# Patient Record
Sex: MALE | Race: WHITE | Employment: OTHER | URBAN - NONMETROPOLITAN AREA
[De-identification: names, ages, dates, MRNs, and addresses within clinical notes are randomized per-mention and may not be internally consistent; named-entity substitution may affect disease eponyms.]

---

## 2017-07-16 LAB
AMORPHOUS: ABNORMAL /HPF
BACTERIA: ABNORMAL /HPF
EPITHELIAL CELLS, UA: ABNORMAL /HPF
MUCUS: ABNORMAL /LPF
RBC UA: ABNORMAL /HPF (ref 0–2)
WBC UA: ABNORMAL /HPF (ref 0–5)

## 2018-08-06 ENCOUNTER — HOSPITAL ENCOUNTER (EMERGENCY)
Age: 43
Discharge: HOME | End: 2018-08-06
Payer: COMMERCIAL

## 2018-08-06 VITALS
SYSTOLIC BLOOD PRESSURE: 105 MMHG | BODY MASS INDEX: 26.41 KG/M2 | HEIGHT: 72 IN | RESPIRATION RATE: 20 BRPM | TEMPERATURE: 98.4 F | OXYGEN SATURATION: 100 % | WEIGHT: 195 LBS | DIASTOLIC BLOOD PRESSURE: 92 MMHG | HEART RATE: 75 BPM

## 2018-08-06 DIAGNOSIS — H81.10 BENIGN PAROXYSMAL POSITIONAL VERTIGO, UNSPECIFIED LATERALITY: ICD-10-CM

## 2018-08-06 DIAGNOSIS — T16.1XXA FOREIGN BODY OF RIGHT EAR, INITIAL ENCOUNTER: Primary | ICD-10-CM

## 2018-08-06 PROCEDURE — 4500000022 HC ED LEVEL 2 PROCEDURE

## 2018-08-06 PROCEDURE — 99282 EMERGENCY DEPT VISIT SF MDM: CPT

## 2018-08-06 RX ORDER — MECLIZINE HYDROCHLORIDE 25 MG/1
25 TABLET ORAL 3 TIMES DAILY PRN
Qty: 15 TABLET | Refills: 0 | Status: SHIPPED | OUTPATIENT
Start: 2018-08-06

## 2018-08-06 NOTE — ED PROVIDER NOTES
LENNY independent visit    Patient was pressure washing a big rig truck and there was a lot of debris coming off of it and water getting in his face 2 weeks ago and ever since has had right ear ache he has flushed his ear and intermittent episodes of vertigo x 2 weeks usually in the mornings if he lays down or quick movements turning head and then dizziness seems to go away throughout the day. Hx vertigo several yrs tx with antivert ago this feels the same. No headache. No numbness or weakness. No vision changes. No chest pain or SOB. His mother told him to flush his ear with hydrogen peroxide he did this Saturday, 2 days ago and has had increased dizziness and ringing in ears since. No drainage or bleeding from ear. No dizziness now. The history is provided by the patient and the spouse. Ear Problem   Location:  Right  Behind ear:  No abnormality  Quality:  Aching  Onset quality:  Gradual  Duration:  2 weeks  Timing:  Intermittent  Chronicity:  New  Context: water in ear    Associated symptoms: tinnitus    Associated symptoms: no abdominal pain, no ear discharge, no fever, no headaches, no hearing loss, no neck pain, no rash, no rhinorrhea, no sore throat and no vomiting        Review of Systems   Constitutional: Negative for chills and fever. HENT: Positive for ear pain and tinnitus. Negative for ear discharge, hearing loss, rhinorrhea and sore throat. Eyes: Negative for visual disturbance. Respiratory: Negative for shortness of breath. Cardiovascular: Negative for chest pain. Gastrointestinal: Negative for abdominal pain and vomiting. Musculoskeletal: Negative for neck pain. Skin: Negative for rash. Neurological: Positive for dizziness. Negative for syncope, facial asymmetry, speech difficulty, weakness, numbness and headaches. Psychiatric/Behavioral: Negative for confusion. PAST MEDICAL HISTORY   has a past medical history of Abdominal pain (2012); Constipation; Nausea;  Reflux; and Wears glasses. PAST SURGICAL HISTORY   has a past surgical history that includes knee surgery; Colonoscopy (5/14/2012); and Wrist surgery (Left, 7/17/2013). FAMILY HISTORY  family history includes Cancer in his father, maternal aunt, and mother; Heart Disease in his father and mother; Mental Illness in his brother, father, and mother; Substance Abuse in his brother and mother. SOCIAL HISTORY   reports that he has been smoking Cigarettes. He has been smoking about 0.75 packs per day. He has never used smokeless tobacco. He reports that he drinks alcohol. He reports that he uses drugs, including Marijuana. HOME MEDICATIONS     none     ALLERGIES  has No Known Allergies. BP (!) 105/92   Pulse 75   Temp 98.4 °F (36.9 °C) (Oral)   Resp 20   Ht 6' (1.829 m)   Wt 195 lb (88.5 kg)   SpO2 100%   BMI 26.45 kg/m²     Physical Exam   Constitutional: He is oriented to person, place, and time. He appears well-developed and well-nourished. HENT:   Head: Normocephalic and atraumatic. Right Ear: Tympanic membrane normal. No drainage, swelling or tenderness. A foreign body (0.5cmx0.5cm thin black ? paint chip in ear canal deep at TM) is present. No mastoid tenderness. Tympanic membrane is not perforated, not erythematous and not bulging. No middle ear effusion. No hemotympanum. Left Ear: Tympanic membrane and ear canal normal. No drainage, swelling or tenderness. No mastoid tenderness. Tympanic membrane is not perforated, not erythematous and not bulging. No middle ear effusion. No hemotympanum. Nose: Nose normal.   Mouth/Throat: Uvula is midline, oropharynx is clear and moist and mucous membranes are normal. No posterior oropharyngeal edema or posterior oropharyngeal erythema. Eyes: Conjunctivae and EOM are normal. Pupils are equal, round, and reactive to light. Neck: Normal range of motion. Neck supple. Cardiovascular: Normal rate, regular rhythm, normal heart sounds and intact distal pulses. Pulmonary/Chest: Effort normal and breath sounds normal. No respiratory distress. He has no wheezes. He has no rales. Abdominal: Soft. Bowel sounds are normal. There is no tenderness. There is no rigidity, no rebound and no guarding. Musculoskeletal: Normal range of motion. Neurological: He is alert and oriented to person, place, and time. He has normal strength. No cranial nerve deficit or sensory deficit. He exhibits normal muscle tone. He displays a negative Romberg sign. Coordination and gait normal. GCS eye subscore is 4. GCS verbal subscore is 5. GCS motor subscore is 6. Cranial facial musculature and sensation are intact. the pt has no nuchal rigidity or meningismus. Pt has intact finger to nose and intact visual fields grossly intact bilaterally. Pt is able to walk heel to toe without difficulty or ataxia and is able to extend upper extremities without pronation or drift. Skin: Skin is warm and dry. Psychiatric: He has a normal mood and affect. His speech is normal and behavior is normal. Thought content normal. Cognition and memory are normal.   Vitals reviewed. Procedures    MDM  Number of Diagnoses or Management Options  Benign paroxysmal positional vertigo, unspecified laterality:   Foreign body of right ear, initial encounter:   Patient Progress  Patient progress: stable           7:39 PM  Patient presenting with his right ear bothering him and intermittent vertigo he has been flushing his ear. Upon examination there is a small foreign body in the ear. The ER tech irrigated his ear with warm water and foreign body was easily removed patient is feeling better he has no pain of the ear he has no dizziness. I reexamined TM is mildly injected likely due to irrigation there is no lucas erythema or bulging does not appear infected, no perforation or bleeding. Advised to keep his years clean and dry. I will go ahead and give him a prescription for Antivert as needed for vertigo.   He

## 2018-08-07 ASSESSMENT — ENCOUNTER SYMPTOMS
VOMITING: 0
SORE THROAT: 0
SHORTNESS OF BREATH: 0
ABDOMINAL PAIN: 0
RHINORRHEA: 0

## 2019-10-27 ENCOUNTER — HOSPITAL ENCOUNTER (EMERGENCY)
Age: 44
Discharge: HOME | End: 2019-10-27
Attending: EMERGENCY MEDICINE
Payer: COMMERCIAL

## 2019-10-27 VITALS
TEMPERATURE: 99.2 F | RESPIRATION RATE: 18 BRPM | BODY MASS INDEX: 24.38 KG/M2 | SYSTOLIC BLOOD PRESSURE: 142 MMHG | HEIGHT: 72 IN | OXYGEN SATURATION: 98 % | WEIGHT: 180 LBS | DIASTOLIC BLOOD PRESSURE: 105 MMHG | HEART RATE: 97 BPM

## 2019-10-27 DIAGNOSIS — F19.959: Primary | ICD-10-CM

## 2019-10-27 DIAGNOSIS — F15.10 METHAMPHETAMINE ABUSE (HCC): ICD-10-CM

## 2019-10-27 LAB
ACETAMINOPHEN LEVEL: <5 UG/ML (ref 10–30)
AMPHETAMINE SCREEN, URINE: POSITIVE
ANION GAP SERPL CALCULATED.3IONS-SCNC: 13 MMOL/L (ref 3–16)
BARBITURATE SCREEN URINE: ABNORMAL
BASOPHILS ABSOLUTE: 0 K/UL (ref 0–0.2)
BASOPHILS RELATIVE PERCENT: 0.4 %
BENZODIAZEPINE SCREEN, URINE: ABNORMAL
BUN BLDV-MCNC: 12 MG/DL (ref 7–20)
CALCIUM SERPL-MCNC: 9.3 MG/DL (ref 8.3–10.6)
CANNABINOID SCREEN URINE: POSITIVE
CHLORIDE BLD-SCNC: 98 MMOL/L (ref 99–110)
CO2: 24 MMOL/L (ref 21–32)
COCAINE METABOLITE SCREEN URINE: ABNORMAL
CREAT SERPL-MCNC: 1 MG/DL (ref 0.9–1.3)
EOSINOPHILS ABSOLUTE: 0 K/UL (ref 0–0.6)
EOSINOPHILS RELATIVE PERCENT: 0.6 %
ETHANOL: NORMAL MG/DL (ref 0–0.08)
GFR AFRICAN AMERICAN: >60
GFR NON-AFRICAN AMERICAN: >60
GLUCOSE BLD-MCNC: 127 MG/DL (ref 70–99)
HCT VFR BLD CALC: 39.3 % (ref 40.5–52.5)
HEMOGLOBIN: 13.8 G/DL (ref 13.5–17.5)
LYMPHOCYTES ABSOLUTE: 0.6 K/UL (ref 1–5.1)
LYMPHOCYTES RELATIVE PERCENT: 10.5 %
Lab: ABNORMAL
MAGNESIUM: 1.9 MG/DL (ref 1.8–2.4)
MCH RBC QN AUTO: 33.1 PG (ref 26–34)
MCHC RBC AUTO-ENTMCNC: 35.2 G/DL (ref 31–36)
MCV RBC AUTO: 94.1 FL (ref 80–100)
METHADONE SCREEN, URINE: ABNORMAL
MONOCYTES ABSOLUTE: 0.6 K/UL (ref 0–1.3)
MONOCYTES RELATIVE PERCENT: 10.1 %
NEUTROPHILS ABSOLUTE: 4.4 K/UL (ref 1.7–7.7)
NEUTROPHILS RELATIVE PERCENT: 78.4 %
OPIATE SCREEN URINE: ABNORMAL
OXYCODONE URINE: ABNORMAL
PDW BLD-RTO: 13.3 % (ref 12.4–15.4)
PH UA: 7
PHENCYCLIDINE SCREEN URINE: ABNORMAL
PLATELET # BLD: 218 K/UL (ref 135–450)
PMV BLD AUTO: 7.5 FL (ref 5–10.5)
POTASSIUM REFLEX MAGNESIUM: 3.5 MMOL/L (ref 3.5–5.1)
PROPOXYPHENE SCREEN: ABNORMAL
RBC # BLD: 4.18 M/UL (ref 4.2–5.9)
SALICYLATE, SERUM: <0.3 MG/DL (ref 15–30)
SODIUM BLD-SCNC: 135 MMOL/L (ref 136–145)
WBC # BLD: 5.6 K/UL (ref 4–11)

## 2019-10-27 PROCEDURE — 80307 DRUG TEST PRSMV CHEM ANLYZR: CPT

## 2019-10-27 PROCEDURE — 6370000000 HC RX 637 (ALT 250 FOR IP)

## 2019-10-27 PROCEDURE — 83735 ASSAY OF MAGNESIUM: CPT

## 2019-10-27 PROCEDURE — G0480 DRUG TEST DEF 1-7 CLASSES: HCPCS

## 2019-10-27 PROCEDURE — 85025 COMPLETE CBC W/AUTO DIFF WBC: CPT

## 2019-10-27 PROCEDURE — 80048 BASIC METABOLIC PNL TOTAL CA: CPT

## 2019-10-27 PROCEDURE — 99284 EMERGENCY DEPT VISIT MOD MDM: CPT

## 2019-10-27 PROCEDURE — 2580000003 HC RX 258: Performed by: EMERGENCY MEDICINE

## 2019-10-27 RX ORDER — LORAZEPAM 1 MG/1
TABLET ORAL
Status: COMPLETED
Start: 2019-10-27 | End: 2019-10-27

## 2019-10-27 RX ORDER — LORAZEPAM 1 MG/1
2 TABLET ORAL ONCE
Status: COMPLETED | OUTPATIENT
Start: 2019-10-27 | End: 2019-10-27

## 2019-10-27 RX ORDER — 0.9 % SODIUM CHLORIDE 0.9 %
1000 INTRAVENOUS SOLUTION INTRAVENOUS ONCE
Status: COMPLETED | OUTPATIENT
Start: 2019-10-27 | End: 2019-10-27

## 2019-10-27 RX ORDER — HALOPERIDOL 5 MG
TABLET ORAL
Status: COMPLETED
Start: 2019-10-27 | End: 2019-10-27

## 2019-10-27 RX ORDER — HALOPERIDOL 5 MG
5 TABLET ORAL ONCE
Status: COMPLETED | OUTPATIENT
Start: 2019-10-27 | End: 2019-10-27

## 2019-10-27 RX ADMIN — HALOPERIDOL 5 MG: 5 TABLET ORAL at 07:07

## 2019-10-27 RX ADMIN — SODIUM CHLORIDE 1000 ML: 9 INJECTION, SOLUTION INTRAVENOUS at 04:55

## 2019-10-27 RX ADMIN — Medication 5 MG: at 07:07

## 2019-10-27 RX ADMIN — LORAZEPAM 2 MG: 1 TABLET ORAL at 07:06

## 2020-08-10 ENCOUNTER — HOSPITAL ENCOUNTER (EMERGENCY)
Age: 45
Discharge: HOME | End: 2020-08-10
Attending: STUDENT IN AN ORGANIZED HEALTH CARE EDUCATION/TRAINING PROGRAM
Payer: COMMERCIAL

## 2020-08-10 VITALS
RESPIRATION RATE: 24 BRPM | BODY MASS INDEX: 25.06 KG/M2 | SYSTOLIC BLOOD PRESSURE: 115 MMHG | DIASTOLIC BLOOD PRESSURE: 72 MMHG | WEIGHT: 185 LBS | OXYGEN SATURATION: 99 % | HEIGHT: 72 IN | HEART RATE: 68 BPM | TEMPERATURE: 99.8 F

## 2020-08-10 LAB
A/G RATIO: 1.6 (ref 1.1–2.2)
ACETAMINOPHEN LEVEL: <5 UG/ML (ref 10–30)
ALBUMIN SERPL-MCNC: 4.3 G/DL (ref 3.4–5)
ALP BLD-CCNC: 49 U/L (ref 40–129)
ALT SERPL-CCNC: 248 U/L (ref 10–40)
AMPHETAMINE SCREEN, URINE: POSITIVE
ANION GAP SERPL CALCULATED.3IONS-SCNC: 11 MMOL/L (ref 3–16)
AST SERPL-CCNC: 102 U/L (ref 15–37)
BARBITURATE SCREEN URINE: ABNORMAL
BASOPHILS ABSOLUTE: 0 K/UL (ref 0–0.2)
BASOPHILS RELATIVE PERCENT: 0.8 %
BENZODIAZEPINE SCREEN, URINE: ABNORMAL
BILIRUB SERPL-MCNC: 1.4 MG/DL (ref 0–1)
BILIRUBIN URINE: ABNORMAL
BLOOD, URINE: NEGATIVE
BUN BLDV-MCNC: 12 MG/DL (ref 7–20)
CALCIUM SERPL-MCNC: 9.3 MG/DL (ref 8.3–10.6)
CANNABINOID SCREEN URINE: POSITIVE
CHLORIDE BLD-SCNC: 102 MMOL/L (ref 99–110)
CLARITY: CLEAR
CO2: 26 MMOL/L (ref 21–32)
COCAINE METABOLITE SCREEN URINE: ABNORMAL
COLOR: YELLOW
CREAT SERPL-MCNC: 0.9 MG/DL (ref 0.9–1.3)
EOSINOPHILS ABSOLUTE: 0.1 K/UL (ref 0–0.6)
EOSINOPHILS RELATIVE PERCENT: 1.5 %
ETHANOL: NORMAL MG/DL (ref 0–0.08)
GFR AFRICAN AMERICAN: >60
GFR NON-AFRICAN AMERICAN: >60
GLOBULIN: 2.7 G/DL
GLUCOSE BLD-MCNC: 132 MG/DL (ref 70–99)
GLUCOSE URINE: NEGATIVE MG/DL
HCT VFR BLD CALC: 40.8 % (ref 40.5–52.5)
HEMOGLOBIN: 14.2 G/DL (ref 13.5–17.5)
KETONES, URINE: ABNORMAL MG/DL
LEUKOCYTE ESTERASE, URINE: NEGATIVE
LYMPHOCYTES ABSOLUTE: 1.1 K/UL (ref 1–5.1)
LYMPHOCYTES RELATIVE PERCENT: 19.9 %
Lab: ABNORMAL
MCH RBC QN AUTO: 34.8 PG (ref 26–34)
MCHC RBC AUTO-ENTMCNC: 34.8 G/DL (ref 31–36)
MCV RBC AUTO: 100.2 FL (ref 80–100)
METHADONE SCREEN, URINE: ABNORMAL
MICROSCOPIC EXAMINATION: ABNORMAL
MONOCYTES ABSOLUTE: 1 K/UL (ref 0–1.3)
MONOCYTES RELATIVE PERCENT: 16.9 %
NEUTROPHILS ABSOLUTE: 3.4 K/UL (ref 1.7–7.7)
NEUTROPHILS RELATIVE PERCENT: 60.9 %
NITRITE, URINE: NEGATIVE
OPIATE SCREEN URINE: ABNORMAL
OXYCODONE URINE: ABNORMAL
PDW BLD-RTO: 13.4 % (ref 12.4–15.4)
PH UA: 6
PH UA: 6 (ref 5–8)
PHENCYCLIDINE SCREEN URINE: ABNORMAL
PLATELET # BLD: 143 K/UL (ref 135–450)
PMV BLD AUTO: 8.2 FL (ref 5–10.5)
POTASSIUM REFLEX MAGNESIUM: 3.6 MMOL/L (ref 3.5–5.1)
PROPOXYPHENE SCREEN: ABNORMAL
PROTEIN UA: NEGATIVE MG/DL
RBC # BLD: 4.07 M/UL (ref 4.2–5.9)
SALICYLATE, SERUM: <0.3 MG/DL (ref 15–30)
SODIUM BLD-SCNC: 139 MMOL/L (ref 136–145)
SPECIFIC GRAVITY UA: 1.02 (ref 1–1.03)
TOTAL PROTEIN: 7 G/DL (ref 6.4–8.2)
TROPONIN: <0.01 NG/ML
URINE REFLEX TO CULTURE: ABNORMAL
URINE TYPE: ABNORMAL
UROBILINOGEN, URINE: >=8 E.U./DL
WBC # BLD: 5.7 K/UL (ref 4–11)

## 2020-08-10 PROCEDURE — 80074 ACUTE HEPATITIS PANEL: CPT

## 2020-08-10 PROCEDURE — 80053 COMPREHEN METABOLIC PANEL: CPT

## 2020-08-10 PROCEDURE — 80307 DRUG TEST PRSMV CHEM ANLYZR: CPT

## 2020-08-10 PROCEDURE — 81003 URINALYSIS AUTO W/O SCOPE: CPT

## 2020-08-10 PROCEDURE — 2580000003 HC RX 258: Performed by: NURSE PRACTITIONER

## 2020-08-10 PROCEDURE — 85025 COMPLETE CBC W/AUTO DIFF WBC: CPT

## 2020-08-10 PROCEDURE — 84484 ASSAY OF TROPONIN QUANT: CPT

## 2020-08-10 PROCEDURE — G0480 DRUG TEST DEF 1-7 CLASSES: HCPCS

## 2020-08-10 PROCEDURE — 99283 EMERGENCY DEPT VISIT LOW MDM: CPT

## 2020-08-10 RX ORDER — 0.9 % SODIUM CHLORIDE 0.9 %
1000 INTRAVENOUS SOLUTION INTRAVENOUS ONCE
Status: COMPLETED | OUTPATIENT
Start: 2020-08-10 | End: 2020-08-10

## 2020-08-10 RX ADMIN — SODIUM CHLORIDE 1000 ML: 9 INJECTION, SOLUTION INTRAVENOUS at 11:51

## 2020-08-10 ASSESSMENT — PAIN DESCRIPTION - PAIN TYPE: TYPE: ACUTE PAIN

## 2020-08-10 ASSESSMENT — ENCOUNTER SYMPTOMS
RHINORRHEA: 0
SHORTNESS OF BREATH: 0
ABDOMINAL PAIN: 0
BACK PAIN: 1
SORE THROAT: 0
COLOR CHANGE: 0

## 2020-08-10 ASSESSMENT — PAIN DESCRIPTION - LOCATION: LOCATION: HEAD;NECK;BACK

## 2020-08-10 ASSESSMENT — PAIN SCALES - GENERAL: PAINLEVEL_OUTOF10: 7

## 2020-08-10 NOTE — ED PROVIDER NOTES
Evaluated by [id number] [facility] Provider          [facility] ED  EMERGENCY DEPARTMENT ENCOUNTER        Pt Name: Gabrielle Montemayor  MRN: [id number]  [place] 1975  Dateof evaluation: 8/10/2020  Provider: GREG Gonzalez CNP  PCP: No primary care provider on file. ED Attending: No att. providers found    [address] [place] [address]       Chief Complaint   Patient presents with    Psychiatric Evaluation     pt took meth last night c/o neck pain, urinary pain, back pain. Wants to speak to  feared for his life       HISTORY OF PRESENTILLNESS   (Location/Symptom, Timing/Onset, Context/Setting, Quality, Duration, Modifying Factors, Severity)  Note limiting factors. Gabrielle Montemayor is a 38 y.o. male for constellation of complaints. Onset was last night. Context includes Patient states that he used ice yesterday with a friend. Patient states that he saw things that have frightened him. Patient is requesting to speak to the [facility] department. Patient states that he is in fear of his life at this point. Patient would like to be checked out because he has generalized achiness back pain neck pain and dysuria. Patient denies suicidal or homicidal ideations. Alleviating factors include nothing. Aggravating factors include nothing. Pain is 7/10. Nothing has been used for pain today. Nursing Notes were all reviewed and agreed with or any disagreements were addressed  in the HPI. REVIEW OF SYSTEMS    (2-9 systems for level 4, 10 or more for level 5)     Review of Systems   Constitutional: Negative for fever. HENT: Negative for congestion, rhinorrhea and sore throat. Respiratory: Negative for shortness of breath. Cardiovascular: Negative for chest pain. Gastrointestinal: Negative for abdominal pain. Genitourinary: Negative for decreased urine volume and difficulty urinating. Musculoskeletal: Positive for back pain and myalgias. Negative for arthralgias. Skin: Negative for color change and rash. Neurological: Positive for headaches. Negative for dizziness and light-headedness. Psychiatric/Behavioral: Negative for agitation, self-injury and suicidal ideas. The patient is nervous/anxious. All other systems reviewed and are negative. Positives and Pertinent negatives as per HPI. Except as noted above in the ROS, all other systems were reviewed and negative. PAST MEDICAL HISTORY     Past Medical History:   Diagnosis Date    Abdominal pain 2012    Constipation     Nausea     Reflux     Wears glasses          SURGICAL HISTORY       Past Surgical History:   Procedure Laterality Date    COLONOSCOPY  5/14/2012    KNEE SURGERY      mcl, acl    WRIST SURGERY Left 7/17/2013    left thumb repair, ulnar and radial nerve repair         CURRENT MEDICATIONS       Discharge Medication List as of 8/10/2020  3:52 PM      CONTINUE these medications which have NOT CHANGED    Details   meclizine (ANTIVERT) 25 MG tablet Take 1 tablet by mouth 3 times daily as needed for Dizziness, Disp-15 tablet, R-0Print               ALLERGIES     Patient has no known allergies.     FAMILY HISTORY       Family History   Problem Relation Age of Onset    Heart Disease Mother     Cancer Mother     Substance Abuse Mother     Mental Illness Mother     Cancer Father         prostate    Heart Disease Father     Mental Illness Father     Cancer Maternal Aunt         stomach    Mental Illness Brother     Substance Abuse Brother           SOCIAL HISTORY       Social History     Socioeconomic History    Marital status:      Spouse name: None    Number of children: None    Years of education: None    Highest education level: None   Occupational History    None   Social Needs    Financial resource strain: None    Food insecurity     Worry: None     Inability: None    Transportation needs     Medical: None     Non-medical: None   Tobacco Use    Smoking status: within normal limits    Narrative:     Performed at:  [facility] [address],  [place], [facility]   Phone [phone]   TROPONIN    Narrative:     Performed at:  [facility] [address],  [place], [facility]   Phone [phone]   ETHANOL    Narrative:     Performed at:  [facility]) - [facility] [address],  [place], [facility]   Phone [phone]   HEPATITIS PANEL, ACUTE       All other labs werewithin normal range or not returned as of this dictation. EKG: All EKG's are interpreted by the Emergency Department Physician who either signs or Co-signs this chart in the absence of a cardiologist.  Please see their note for interpretation of EKG. RADIOLOGY:           Interpretation per the Radiologist below, if available at the time of this note:    No orders to display     No results found. PROCEDURES   Unless otherwise noted below, none     Procedures     CRITICAL CARE TIME   N/A    CONSULTS:  IP CONSULT TO PSYCHIATRY      EMERGENCYDEPARTMENT COURSE and DIFFERENTIAL DIAGNOSIS/MDM:   Vitals:    Vitals:    08/10/20 1204 08/10/20 1258 08/10/20 1303 08/10/20 1333   BP: 121/75 120/71 121/81 115/72   Pulse: 72 74 70 68   Resp: 16 16 20 24   Temp:       TempSrc:       SpO2: 94% 100% 99% 99%   Weight:       Height:           Patient was given the following medications:  Medications   0.9 % sodium chloride bolus (0 mLs Intravenous Stopped 8/10/20 1350)       Patient was seen and evaluated by myself. Patient here for multiple complaints including generalized achiness. Patient states that he used ice intravenously yesterday by someone he thought was one of his friends however today has been achiness. Patient denies any fever nausea vomiting or diarrhea. He reports that he is afraid for his life and is requesting to speak to the police.   He patient reports that he saw some things last night but he was very concerned over. Patient denies any suicidal or homicidal ideations at this time. He denies any chest pain or shortness of breath. on exam the patient is awake and alert he was found to be tachycardic however with IV fluids his heart rate has improved. Patient did have labs that were reviewed. He did have elevated liver enzymes and an elevated total bili. He was positive for amphetamines and marijuana. [facility] police discussed with the patient his concerns. Patient at this point was considered medically cleared and has been consulted with behavioral health for an evaluation and assistance and final disposition. Behavior health approached myself and states that there was not a reason for him to be seen since he is not suicidal or homicidal.  I did explain to him that he was having paranoid thoughts and should be evaluated however they felt that this could be handled on an outpatient basis. At this point the patient was discharged home with instructions for [facility]. He was given a referral for PCP and encouraged to follow-up and establish care. Patient was also given a GI referral due to his elevated liver enzymes. Hepatitis panel is currently pending. He was encouraged to return to the ED for any worsening symptoms. The patient tolerated their visit well. I have evaluated this patient. My supervising physician was available for consultation. The patient and / or the family were informed of the results of any tests, a time was given to answer questions, a plan was proposed and they agreed with plan. FINAL IMPRESSION      1. Positive urine drug screen    2. Elevated liver function tests    3. Total bilirubin, elevated    4.  Paranoid [facility])          DISPOSITION/PLAN   DISPOSITION Decision To Discharge 08/10/2020 02:10:42 PM      PATIENT REFERRED TO:  [facility]) Pre-Services  [phone]  Schedule an appointment as soon as possible for a visit in 1 week  for re-evaluation, to establish primary care    [facility]  [address] [facility]  [phone]    If symptoms worsen    [facility]  call [phone] to schedule an appointment  Call today      Maria C Camp DO  [address] [facility] [address]  [phone]      for re-evaluation elevated liver enz      DISCHARGE MEDICATIONS:  Discharge Medication List as of 8/10/2020  3:52 PM          DISCONTINUED MEDICATIONS:  Discharge Medication List as of 8/10/2020  3:52 PM                 (Please note that portions of this note were completed with a voice recognition program.  Efforts were made to edit the dictations but occasionally words are mis-transcribed.)    GREG Olivas CNP (electronically signed)         GREG Olivas CNP  08/10/20 GREG Downs CNP  08/10/20 7804

## 2020-08-10 NOTE — ED NOTES
1057- Call placed Dispatch per Pt and wanted to speak to a . They will send someone out.        1114- CCSO was in the room with the pt      Winnie Abrams  08/10/20 [facility]  08/10/20 1117

## 2020-08-10 NOTE — ED NOTES
Level of Care Disposition:  Discharge    Patient was seen by ED provider and [facility] AN AFFILIATE OF [facility] staff. The case was presented to psychiatric provider on-call Dr. Agata Myers  Based on the ED evaluation and information presented to the provider by Kye Telles, the decision was made to discharge patient with the following referrals: follow up with your outpatient providers. RATIONALE FOR NON-ADMISSION:  The patient does not meet criteria for an involuntary psychiatric admission because he does not pose an imminent risk to himself or anyone else.               Pia Reyes RN  08/10/20 7760

## 2020-08-10 NOTE — ED NOTES
Attempted to interview patient. He states he is not suicidal/homicial or having paranoid thoughts. He was alert and oriented, able to answer questions appropriately. He states he is already set up with outside providers. He states he did some drugs this weekend. He states he does not need a psych evaluation. [facility] to this writer not finding a mental issue to evaluate. [facility] acknowledges this, and states they will discharge the patient.      Chery Metzger RN  08/10/20 2357

## 2020-08-10 NOTE — ED PROVIDER NOTES
I signed up for this patient in error and did not see, examine, or participate in his care.          Jose Burger MD  08/10/20 3093

## 2020-08-11 LAB
HAV IGM SER IA-ACNC: ABNORMAL
HEPATITIS B CORE IGM ANTIBODY: REACTIVE
HEPATITIS B SURFACE ANTIGEN INTERPRETATION: ABNORMAL
HEPATITIS C ANTIBODY INTERPRETATION: REACTIVE

## 2021-12-17 ENCOUNTER — HOSPITAL ENCOUNTER (EMERGENCY)
Age: 46
Discharge: HOME | End: 2021-12-17
Attending: EMERGENCY MEDICINE
Payer: COMMERCIAL

## 2021-12-17 ENCOUNTER — APPOINTMENT (OUTPATIENT)
Dept: CT IMAGING | Age: 46
End: 2021-12-17
Payer: COMMERCIAL

## 2021-12-17 VITALS
HEIGHT: 72 IN | BODY MASS INDEX: 26.01 KG/M2 | RESPIRATION RATE: 19 BRPM | HEART RATE: 85 BPM | WEIGHT: 192 LBS | OXYGEN SATURATION: 100 % | TEMPERATURE: 98.5 F | DIASTOLIC BLOOD PRESSURE: 75 MMHG | SYSTOLIC BLOOD PRESSURE: 138 MMHG

## 2021-12-17 DIAGNOSIS — R10.9 FLANK PAIN: Primary | ICD-10-CM

## 2021-12-17 LAB
BILIRUBIN URINE: NEGATIVE
BLOOD, URINE: NEGATIVE
CLARITY: CLEAR
COLOR: YELLOW
GLUCOSE URINE: NEGATIVE MG/DL
KETONES, URINE: NEGATIVE MG/DL
LEUKOCYTE ESTERASE, URINE: NEGATIVE
MICROSCOPIC EXAMINATION: NORMAL
NITRITE, URINE: NEGATIVE
PH UA: 6 (ref 5–8)
PROTEIN UA: NEGATIVE MG/DL
SPECIFIC GRAVITY UA: 1.01 (ref 1–1.03)
URINE REFLEX TO CULTURE: NORMAL
URINE TYPE: NORMAL
UROBILINOGEN, URINE: 0.2 E.U./DL

## 2021-12-17 PROCEDURE — 74176 CT ABD & PELVIS W/O CONTRAST: CPT

## 2021-12-17 PROCEDURE — 99283 EMERGENCY DEPT VISIT LOW MDM: CPT

## 2021-12-17 PROCEDURE — 81003 URINALYSIS AUTO W/O SCOPE: CPT

## 2021-12-17 ASSESSMENT — PAIN SCALES - GENERAL: PAINLEVEL_OUTOF10: 5

## 2021-12-17 ASSESSMENT — ENCOUNTER SYMPTOMS
ABDOMINAL DISTENTION: 0
NAUSEA: 0
DIARRHEA: 0
ABDOMINAL PAIN: 0
SHORTNESS OF BREATH: 0

## 2021-12-17 ASSESSMENT — PAIN DESCRIPTION - LOCATION: LOCATION: ABDOMEN

## 2021-12-17 ASSESSMENT — PAIN DESCRIPTION - ORIENTATION: ORIENTATION: LEFT

## 2021-12-17 ASSESSMENT — PAIN DESCRIPTION - PAIN TYPE: TYPE: ACUTE PAIN

## 2021-12-17 NOTE — ED NOTES
Pt AVS reviewed and need for primary care follow up, pt express understanding. Pt d/c at this time.       Naman Cano RN  12/17/21 4867

## 2021-12-17 NOTE — ED TRIAGE NOTES
Pt presents ambulatory from the [facility] c/o LLQ abd pain, difficulty urinating, constipation and residual covid effects. Pt states \"I know my body and I shouldn't feel this way\".  Pt A&Ox4, VSS on arrival

## 2021-12-17 NOTE — ED PROVIDER NOTES
[address] [facility] Name: Dipesh Hays  MRN: [id number]  [place] 1975  Date of evaluation: 12/17/2021  Provider: Amilcar Chakraboryt MD    [address] [place] [address]       Chief Complaint   Patient presents with    Flank Pain         HISTORY OF PRESENT ILLNESS   (Location/Symptom, Timing/Onset, Context/Setting, Quality, Duration, Modifying Factors, Severity)  Note limiting factors. Dipesh Hays is a 40 y.o. male who presents to the emergency department     Patient presents with about a 7-8 over 10 left flank pain said started about 3 days ago apparently just got into the [facility] so apparently came over here he has a long history of methamphetamine abuse with even some paranoia in the past.  He says that he knows his body and he knows that something is wrong in his left flank  He just wants to make sure that he is healthy he says  Reviewing his chart I see no other medical problems. He denies fever  Denies dysuria frequency  Patient denies nausea vomiting. Denies cough  Vital signs are reviewed and are totally stable    The history is provided by the patient. Nursing Notes were reviewed. REVIEW OF SYSTEMS    (2-9 systems for level 4, 10 or more for level 5)     Review of Systems   Constitutional: Negative for activity change, appetite change, chills, diaphoresis, fatigue and fever. HENT: Negative for congestion. Eyes: Negative for visual disturbance. Respiratory: Negative for shortness of breath. Cardiovascular: Negative for chest pain, palpitations and leg swelling. Gastrointestinal: Negative for abdominal distention, abdominal pain, diarrhea and nausea. Genitourinary: Positive for flank pain. Negative for difficulty urinating. Allergic/Immunologic: Negative for immunocompromised state. All other systems reviewed and are negative. Except as noted above the remainder of the review of systems was reviewed and negative. PAST MEDICAL HISTORY     Past Medical History:   Diagnosis Date    Abdominal pain 2012    Constipation     Hepatitis B 08/10/2020    Hepatitis C 08/10/2020    Nausea     Reflux     Wears glasses          SURGICAL HISTORY       Past Surgical History:   Procedure Laterality Date    COLONOSCOPY  5/14/2012    KNEE SURGERY      mcl, acl    WRIST SURGERY Left 7/17/2013    left thumb repair, ulnar and radial nerve repair         CURRENT MEDICATIONS       Previous Medications    MECLIZINE (ANTIVERT) 25 MG TABLET    Take 1 tablet by mouth 3 times daily as needed for Dizziness       ALLERGIES     Patient has no known allergies. FAMILY HISTORY       Family History   Problem Relation Age of Onset    Heart Disease Mother     Cancer Mother     Substance Abuse Mother     Mental Illness Mother     Cancer Father         prostate    Heart Disease Father     Mental Illness Father     Cancer Maternal Aunt         stomach    Mental Illness Brother     Substance Abuse Brother           SOCIAL HISTORY       Social History     Socioeconomic History    Marital status:      Spouse name: None    Number of children: None    Years of education: None    Highest education level: None   Occupational History    None   Tobacco Use    Smoking status: Former Smoker     Packs/day: 1.00     Years: 25.00     Pack years: 25.00     Types: Cigarettes    Smokeless tobacco: Never Used   Vaping Use    Vaping Use: Never used   Substance and Sexual Activity    Alcohol use:  Yes     Alcohol/week: 2.0 standard drinks     Types: 2 Cans of beer per week     Comment: beers, weekends    Drug use: Yes     Types: Marijuana (Weed), IV, Methamphetamines (Crystal Meth)     Comment: last use March 2021    Sexual activity: Yes     Partners: Female   Other Topics Concern    None   Social History Narrative    None     Social Determinants of Health     Financial Resource Strain:     Difficulty of Paying Living Expenses: Not on file   Food Insecurity:     Worried About Running Out of Food in the Last Year: Not on file    Panchito of Food in the Last Year: Not on file   Transportation Needs:     Lack of Transportation (Medical): Not on file    Lack of Transportation (Non-Medical): Not on file   Physical Activity:     Days of Exercise per Week: Not on file    Minutes of Exercise per Session: Not on file   Stress:     Feeling of Stress : Not on file   Social Connections:     Frequency of Communication with Friends and Family: Not on file    Frequency of Social Gatherings with Friends and Family: Not on file    Attends Yazdanism Services: Not on file    Active Member of [address] [place] [address] [employer] or Organizations: Not on file    Attends Club or Organization Meetings: Not on file    Marital Status: Not on file   Intimate Partner Violence:     Fear of Current or Ex-Partner: Not on file    Emotionally Abused: Not on file    Physically Abused: Not on file    Sexually Abused: Not on file   Housing Stability:     Unable to Pay for Housing in the Last Year: Not on file    Number of [place] in the Last Year: Not on file    Unstable Housing in the Last Year: Not on file       SCREENINGS               PHYSICAL EXAM    (up to 7 for level 4, 8 or more for level 5)     ED Triage Vitals [12/17/21 0815]   BP Temp Temp Source Pulse Resp SpO2 Height Weight   138/75 98.5 °F (36.9 °C) Oral 85 19 100 % 6' (1.829 m) 192 lb (87.1 kg)       Physical Exam  Vitals and nursing note reviewed. Constitutional:       General: He is not in acute distress. Appearance: Normal appearance. He is well-developed. He is not ill-appearing or diaphoretic. HENT:      Head: Normocephalic. Nose: Nose normal.      Mouth/Throat:      Mouth: Mucous membranes are moist.   Eyes:      Conjunctiva/sclera: Conjunctivae normal.      Pupils: Pupils are equal, round, and reactive to light. Neck:      Thyroid: No thyromegaly.    Cardiovascular:      Rate and Rhythm: Normal rate and regular rhythm. Heart sounds: Normal heart sounds. No murmur heard. No friction rub. No gallop. Pulmonary:      Effort: Pulmonary effort is normal. No respiratory distress. Breath sounds: Normal breath sounds. Abdominal:      General: Bowel sounds are normal. There is no distension. Palpations: Abdomen is soft. Tenderness: There is no abdominal tenderness. Musculoskeletal:         General: Normal range of motion. Cervical back: Normal range of motion and neck supple. Neurological:      Mental Status: He is alert and oriented to person, place, and time. GCS: GCS eye subscore is 4. GCS verbal subscore is 5. GCS motor subscore is 6. Cranial Nerves: No cranial nerve deficit. Sensory: No sensory deficit. Motor: No abnormal muscle tone. Coordination: Coordination normal.      Deep Tendon Reflexes: Reflexes normal.   Psychiatric:         Behavior: Behavior normal.         DIAGNOSTIC RESULTS     EKG: All EKG's are interpreted by the Emergency Department Physician who either signs or Co-signs this chart in the absence of a cardiologist.        RADIOLOGY:   Non-plain film images such as CT, Ultrasound and MRI are read by the radiologist. Plain radiographic images are visualized and preliminarily interpreted by the emergency physician with the below findings:        Interpretation per the Radiologist below, if available at the time of this note:    CT ABDOMEN PELVIS WO CONTRAST Additional Contrast? None   Final Result   Bilateral nonobstructing renal calculi      Normal caliber appendix. RECOMMENDATIONS:   Unavailable      Guidelines for follow-up and management of pulmonary nodules found on abdomen   CT:      <6 mm - No follow up recommend on the basis of the estimated low risk of   malignancy. Radiology 2017 http://pubs. rsna.org/doi/full/10.1148/radiol. 0965924702                 LABS:  Results for orders placed or performed during the hospital encounter of 12/17/21   Urinalysis Reflex to Culture    Specimen: Urine, clean catch   Result Value Ref Range    Color, UA Yellow Straw/Yellow    Clarity, UA Clear Clear    Glucose, Ur Negative Negative mg/dL    Bilirubin Urine Negative Negative    Ketones, Urine Negative Negative mg/dL    Specific Gravity, UA 1.010 1.005 - 1.030    Blood, Urine Negative Negative    pH, UA 6.0 5.0 - 8.0    Protein, UA Negative Negative mg/dL    Urobilinogen, Urine 0.2 <2.0 E.U./dL    Nitrite, Urine Negative Negative    Leukocyte Esterase, Urine Negative Negative    Microscopic Examination Not Indicated     Urine Type NotGiven     Urine Reflex to Culture Not Indicated             EMERGENCY DEPARTMENT COURSE and DIFFERENTIAL DIAGNOSIS/MDM:     Vitals:    12/17/21 0815   BP: 138/75   Pulse: 85   Resp: 19   Temp: 98.5 °F (36.9 °C)   TempSrc: Oral   SpO2: 100%   Weight: 192 lb (87.1 kg)   Height: 6' (1.829 m)           MDM      REASSESSMENT          CRITICAL CARE TIME     CONSULTS:  None      PROCEDURES:     Procedures    MEDICATIONS GIVEN THIS VISIT:  Medications - No data to display     FINAL IMPRESSION      1. Flank pain            DISPOSITION/PLAN   DISPOSITION Decision To Discharge 12/17/2021 09:16:59 AM      PATIENT REFERRED TO:  [facility]) Pre-Services  [phone]          DISCHARGE MEDICATIONS:  New Prescriptions    No medications on file       Controlled Substances Monitoring  No flowsheet data found. (Please note that portions of this note were completed with a voice recognition program.  Efforts were made to edit the dictations but occasionally words are mis-transcribed.)    Patient was advised to return to the Emergency Department if there was any worsening.     Kelly Joaquin MD (electronically signed)  Attending Emergency Physician         Jaylene Rogers MD  12/17/21 9909

## 2022-10-21 ENCOUNTER — HOSPITAL ENCOUNTER (EMERGENCY)
Age: 47
Discharge: HOME | End: 2022-10-22
Attending: STUDENT IN AN ORGANIZED HEALTH CARE EDUCATION/TRAINING PROGRAM
Payer: COMMERCIAL

## 2022-10-21 DIAGNOSIS — F19.90 POLYSUBSTANCE USE DISORDER: Primary | ICD-10-CM

## 2022-10-21 LAB
BASOPHILS ABSOLUTE: 0.1 K/UL (ref 0–0.2)
BASOPHILS RELATIVE PERCENT: 0.9 %
EOSINOPHILS ABSOLUTE: 0.1 K/UL (ref 0–0.6)
EOSINOPHILS RELATIVE PERCENT: 2.2 %
HCT VFR BLD CALC: 38.3 % (ref 40.5–52.5)
HEMOGLOBIN: 13.5 G/DL (ref 13.5–17.5)
LYMPHOCYTES ABSOLUTE: 1.6 K/UL (ref 1–5.1)
LYMPHOCYTES RELATIVE PERCENT: 24.6 %
MCH RBC QN AUTO: 34.5 PG (ref 26–34)
MCHC RBC AUTO-ENTMCNC: 35.4 G/DL (ref 31–36)
MCV RBC AUTO: 97.5 FL (ref 80–100)
MONOCYTES ABSOLUTE: 0.7 K/UL (ref 0–1.3)
MONOCYTES RELATIVE PERCENT: 10.3 %
NEUTROPHILS ABSOLUTE: 4.1 K/UL (ref 1.7–7.7)
NEUTROPHILS RELATIVE PERCENT: 62 %
PDW BLD-RTO: 13.5 % (ref 12.4–15.4)
PLATELET # BLD: 193 K/UL (ref 135–450)
PMV BLD AUTO: 7.3 FL (ref 5–10.5)
RBC # BLD: 3.93 M/UL (ref 4.2–5.9)
WBC # BLD: 6.6 K/UL (ref 4–11)

## 2022-10-21 PROCEDURE — 80053 COMPREHEN METABOLIC PANEL: CPT

## 2022-10-21 PROCEDURE — 36415 COLL VENOUS BLD VENIPUNCTURE: CPT

## 2022-10-21 PROCEDURE — 99284 EMERGENCY DEPT VISIT MOD MDM: CPT

## 2022-10-21 PROCEDURE — 80143 DRUG ASSAY ACETAMINOPHEN: CPT

## 2022-10-21 PROCEDURE — 85025 COMPLETE CBC W/AUTO DIFF WBC: CPT

## 2022-10-21 PROCEDURE — 93005 ELECTROCARDIOGRAM TRACING: CPT | Performed by: STUDENT IN AN ORGANIZED HEALTH CARE EDUCATION/TRAINING PROGRAM

## 2022-10-21 PROCEDURE — 2580000003 HC RX 258: Performed by: STUDENT IN AN ORGANIZED HEALTH CARE EDUCATION/TRAINING PROGRAM

## 2022-10-21 PROCEDURE — 82077 ASSAY SPEC XCP UR&BREATH IA: CPT

## 2022-10-21 PROCEDURE — 84443 ASSAY THYROID STIM HORMONE: CPT

## 2022-10-21 PROCEDURE — 80179 DRUG ASSAY SALICYLATE: CPT

## 2022-10-21 RX ORDER — 0.9 % SODIUM CHLORIDE 0.9 %
1000 INTRAVENOUS SOLUTION INTRAVENOUS ONCE
Status: COMPLETED | OUTPATIENT
Start: 2022-10-21 | End: 2022-10-22

## 2022-10-21 RX ADMIN — SODIUM CHLORIDE 1000 ML: 9 INJECTION, SOLUTION INTRAVENOUS at 23:45

## 2022-10-21 ASSESSMENT — PAIN - FUNCTIONAL ASSESSMENT: PAIN_FUNCTIONAL_ASSESSMENT: NONE - DENIES PAIN

## 2022-10-22 VITALS
OXYGEN SATURATION: 98 % | BODY MASS INDEX: 25.06 KG/M2 | WEIGHT: 185 LBS | TEMPERATURE: 97.7 F | RESPIRATION RATE: 14 BRPM | SYSTOLIC BLOOD PRESSURE: 95 MMHG | DIASTOLIC BLOOD PRESSURE: 72 MMHG | HEIGHT: 72 IN | HEART RATE: 81 BPM

## 2022-10-22 LAB
A/G RATIO: 1.8 (ref 1.1–2.2)
ACETAMINOPHEN LEVEL: <5 UG/ML (ref 10–30)
ALBUMIN SERPL-MCNC: 4.4 G/DL (ref 3.4–5)
ALP BLD-CCNC: 51 U/L (ref 40–129)
ALT SERPL-CCNC: 81 U/L (ref 10–40)
AMPHETAMINE SCREEN, URINE: POSITIVE
ANION GAP SERPL CALCULATED.3IONS-SCNC: 6 MMOL/L (ref 3–16)
AST SERPL-CCNC: 43 U/L (ref 15–37)
BARBITURATE SCREEN URINE: ABNORMAL
BENZODIAZEPINE SCREEN, URINE: ABNORMAL
BILIRUB SERPL-MCNC: 0.6 MG/DL (ref 0–1)
BUN BLDV-MCNC: 20 MG/DL (ref 7–20)
CALCIUM SERPL-MCNC: 9 MG/DL (ref 8.3–10.6)
CANNABINOID SCREEN URINE: POSITIVE
CHLORIDE BLD-SCNC: 101 MMOL/L (ref 99–110)
CO2: 30 MMOL/L (ref 21–32)
COCAINE METABOLITE SCREEN URINE: POSITIVE
CREAT SERPL-MCNC: 1.4 MG/DL (ref 0.9–1.3)
EKG ATRIAL RATE: 97 BPM
EKG DIAGNOSIS: NORMAL
EKG P AXIS: 81 DEGREES
EKG P-R INTERVAL: 140 MS
EKG Q-T INTERVAL: 354 MS
EKG QRS DURATION: 92 MS
EKG QTC CALCULATION (BAZETT): 449 MS
EKG R AXIS: 57 DEGREES
EKG T AXIS: 52 DEGREES
EKG VENTRICULAR RATE: 97 BPM
ETHANOL: NORMAL MG/DL (ref 0–0.08)
FENTANYL SCREEN, URINE: ABNORMAL
GFR SERPL CREATININE-BSD FRML MDRD: >60 ML/MIN/{1.73_M2}
GLUCOSE BLD-MCNC: 134 MG/DL (ref 70–99)
Lab: ABNORMAL
METHADONE SCREEN, URINE: ABNORMAL
OPIATE SCREEN URINE: ABNORMAL
OXYCODONE URINE: POSITIVE
PH UA: 6
PHENCYCLIDINE SCREEN URINE: ABNORMAL
POTASSIUM REFLEX MAGNESIUM: 3.8 MMOL/L (ref 3.5–5.1)
SALICYLATE, SERUM: <0.3 MG/DL (ref 15–30)
SODIUM BLD-SCNC: 137 MMOL/L (ref 136–145)
TOTAL PROTEIN: 6.9 G/DL (ref 6.4–8.2)
TSH SERPL DL<=0.05 MIU/L-ACNC: 0.42 UIU/ML (ref 0.27–4.2)

## 2022-10-22 PROCEDURE — 93010 ELECTROCARDIOGRAM REPORT: CPT | Performed by: INTERNAL MEDICINE

## 2022-10-22 PROCEDURE — 80307 DRUG TEST PRSMV CHEM ANLYZR: CPT

## 2022-10-25 NOTE — ED PROVIDER NOTES
[address] ED      CHIEF COMPLAINT  \"Feeling off\"       HISTORY OF PRESENT ILLNESS  Natividad Mendoza is a 40 y.o. male with a past medical history of PTSD, depression, hepatitis, and marijuana use who presents to the ED complaining of \" feeling off\". Patient reports that he was drinking and smoking marijuana when he suddenly felt abnormal.  He has difficulty characterizing exactly how he feels, he just says that he \"feels off\". He denies any other drug use, he is concerned that people slipped him something. He denies any numbness, weakness, tingling. He does report a feeling of some abnormality with his gait, no vertiginous symptoms. He denies chest pain, shortness of breath, vomiting, fever, cough, abdominal pain, vision changes, slurred speech. He denies any trauma. He denies dysuria or fever. 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 I have reviewed the following from the nursing documentation.     Past Medical History:   Diagnosis Date    Abdominal pain 2012    Constipation     Hepatitis B 08/10/2020    Hepatitis C 08/10/2020    Nausea     Reflux     Wears glasses      Past Surgical History:   Procedure Laterality Date    COLONOSCOPY  5/14/2012    KNEE SURGERY      mcl, acl    WRIST SURGERY Left 7/17/2013    left thumb repair, ulnar and radial nerve repair     Family History   Problem Relation Age of Onset    Heart Disease Mother     Cancer Mother     Substance Abuse Mother     Mental Illness Mother     Cancer Father         prostate    Heart Disease Father     Mental Illness Father     Cancer Maternal Aunt         stomach    Mental Illness Brother     Substance Abuse Brother      Social History     Socioeconomic History    Marital status:      Spouse name: Not on file    Number of children: Not on file    Years of education: Not on file    Highest education level: Not on file   Occupational History    Not on file   Tobacco Use    Smoking status: Former     Packs/day: 1.00     Years: 25.00     Pack years: 25.00     Types: Cigarettes    Smokeless tobacco: Never   Vaping Use    Vaping Use: Never used   Substance and Sexual Activity    Alcohol use: Yes     Alcohol/week: 2.0 standard drinks     Types: 2 Cans of beer per week     Comment: today    Drug use: Yes     Types: Marijuana (Weed), IV, Methamphetamines (Crystal Meth)     Comment: last use March 2021    Sexual activity: Yes     Partners: Female   Other Topics Concern    Not on file   Social History Narrative    Not on file     Social Determinants of Health     Financial Resource Strain: Not on file   Food Insecurity: Not on file   Transportation Needs: Not on file   Physical Activity: Not on file   Stress: Not on file   Social Connections: Not on file   Intimate Partner Violence: Not on file   Housing Stability: Not on file     No current facility-administered medications for this encounter. Current Outpatient Medications   Medication Sig Dispense Refill    meclizine (ANTIVERT) 25 MG tablet Take 1 tablet by mouth 3 times daily as needed for Dizziness (Patient not taking: Reported on 10/21/2022) 15 tablet 0     No Known Allergies    REVIEW OF SYSTEMS  All systems reviewed, pertinent positives per HPI otherwise noted to be negative. PHYSICAL EXAM  /80   Pulse 107  Temp 97.7 °F (36.5 °C) (Oral)   Resp 16   Ht 6' (1.829 m)   Wt 185 lb (83.9 kg)   SpO2 97%   BMI 25.09 kg/m²    GENERAL APPEARANCE: Awake and alert. Cooperative. no distress. HENT: Normocephalic. Atraumatic. Mucous membranes are moist  NECK: Supple. Full range of motion of the neck without stiffness or pain. No meningismus  EYES: PERRL. EOM's grossly intact. HEART/CHEST: RR, tachycardia. No murmurs. LUNGS: Respirations unlabored. CTAB. Good air exchange. Speaking comfortably in full sentences. ABDOMEN: No tenderness. Soft. Non-distended. No masses. No organomegaly. No guarding or rebound. MUSCULOSKELETAL: No extremity edema. Compartments soft. No deformity. No tenderness in the extremities. All extremities neurovascularly intact. SKIN: Warm and dry. No acute rashes. PSYCHIATRIC: Anxious  NEUROLOGICAL: Alert and oriented x3 but does appear to be intoxicated. CN's 2-12 intact. No gross facial drooping. Normal gait. Strength 5/5, sensation intact. NIHSS: 0      NIH Stroke Scale     Interval: Baseline  Person Administering Scale: Linda Nieto MD     1a  Level of consciousness: 0=alert; keenly responsive   1b. LOC questions:  0=Performs both tasks correctly   1c. LOC commands: 0=Performs both tasks correctly   2. Best Gaze: 0=normal   3. Visual: 0=No visual loss   4. Facial Palsy: 0=Normal symmetric movement   5a. Motor left arm: 0=No drift, limb holds 90 (or 45) degrees for full 10 seconds   5b. Motor right arm: 0=No drift, limb holds 90 (or 45) degrees for full 10 seconds   6a. motor left le=No drift, limb holds 90 (or 45) degrees for full 10 seconds   6b  Motor right le=No drift, limb holds 90 (or 45) degrees for full 10 seconds   7. Limb Ataxia: 0=Absent   8. Sensory: 0=Normal; no sensory loss   9. Best Language:  0=No aphasia, normal   10. Dysarthria: 0=Normal   11. Extinction and Inattention: 0=No abnormality         Total:  0       LABS  I have reviewed all labs for this visit.    Results for orders placed or performed during the hospital encounter of 10/21/22   CBC with Auto Differential   Result Value Ref Range    WBC 6.6 4.0 - 11.0 K/uL    RBC 3.93 (L) 4.20 - 5.90 M/uL    Hemoglobin 13.5 13.5 - 17.5 g/dL    Hematocrit 38.3 (L) 40.5 - 52.5 %    MCV 97.5 80.0 - 100.0 fL    MCH 34.5 (H) 26.0 - 34.0 pg    MCHC 35.4 31.0 - 36.0 g/dL    RDW 13.5 12.4 - 15.4 %    Platelets 996 247 - 371 K/uL    MPV 7.3 5.0 - 10.5 fL    Neutrophils % 62.0 %    Lymphocytes % 24.6 %    Monocytes % 10.3 %    Eosinophils % 2.2 %    Basophils % 0.9 %    Neutrophils Absolute 4.1 1.7 - 7.7 K/uL    Lymphocytes Absolute 1.6 1.0 - 5.1 K/uL    Monocytes Absolute 0.7 0.0 - 1.3 K/uL    Eosinophils Absolute 0.1 0.0 - 0.6 K/uL    Basophils Absolute 0.1 0.0 - 0.2 K/uL   CMP w/ Reflex to MG   Result Value Ref Range    Sodium 137 136 - 145 mmol/L    Potassium reflex Magnesium 3.8 3.5 - 5.1 mmol/L    Chloride 101 99 - 110 mmol/L    CO2 30 21 - 32 mmol/L    Anion Gap 6 3 - 16    Glucose 134 (H) 70 - 99 mg/dL    BUN 20 7 - 20 mg/dL    Creatinine 1.4 (H) 0.9 - 1.3 mg/dL    Est, Glom Filt Rate >60 >60    Calcium 9.0 8.3 - 10.6 mg/dL    Total Protein 6.9 6.4 - 8.2 g/dL    Albumin 4.4 3.4 - 5.0 g/dL    Albumin/Globulin Ratio 1.8 1.1 - 2.2    Total Bilirubin 0.6 0.0 - 1.0 mg/dL    Alkaline Phosphatase 51 40 - 129 U/L    ALT 81 (H) 10 - 40 U/L    AST 43 (H) 15 - 37 U/L   ETOH   Result Value Ref Range    Ethanol Lvl None Detected mg/dL   TSH   Result Value Ref Range    TSH 0.42 0.27 - 4.20 uIU/mL   Drug screen multi urine   Result Value Ref Range    Amphetamine Screen, Urine POSITIVE (A) Negative <1000ng/mL    Barbiturate Screen, Ur Neg Negative <200 ng/mL    Benzodiazepine Screen, Urine Neg Negative <200 ng/mL    Cannabinoid Scrn, Ur POSITIVE (A) Negative <50 ng/mL    Cocaine Metabolite Screen, Urine POSITIVE (A) Negative <300 ng/mL    Opiate Scrn, Ur Neg Negative <300 ng/mL    PCP Screen, Urine Neg Negative <25 ng/mL    Methadone Screen, Urine Neg Negative <300 ng/mL    Oxycodone Urine POSITIVE (A) Negative <100 ng/ml    FENTANYL SCREEN, URINE Neg Negative <5 ng/mL    pH, UA 6.0     Drug Screen Comment: see below    Acetaminophen Level   Result Value Ref Range    Acetaminophen Level <5 (L) 10 - 30 ug/mL   Salicylate   Result Value Ref Range    Salicylate, Serum <9.7 (L) 15.0 - 30.0 mg/dL   EKG 12 Lead   Result Value Ref Range    Ventricular Rate 97 BPM    Atrial Rate 97 BPM    P-R Interval 140 ms    QRS Duration 92 ms    Q-T Interval 354 ms    QTc Calculation (Bazett) 449 ms    P Axis 81 degrees    R Axis 57 degrees    T Axis 52 degrees    Diagnosis       Normal sinus rhythmNormal ECGConfirmed by JENNIFER Yen MD ([id number]) on 10/22/2022 11:32:46 AM       ECG  The Ekg interpreted by me shows  normal sinus rhythm with a rate of 97  Axis is   Normal  QTc is  within an acceptable range  Intervals and Durations are unremarkable. ST Segments: nonspecific changes  No previous for comparison    RADIOLOGY    No orders to display            ED COURSE / MDM  Patient seen and evaluated. Old records reviewed and pertinent information included in HPI. Labs and imaging reviewed and results discussed with patient. Overall well appearing patient, in no acute distress, presenting for \"feeling off\" after drug use. Physical exam remarkable for intact neurologic exam, mild tachycardia. Differential diagnosis includes but is not limited to: Hypoglycemia, drug intoxication, electrolyte disturbance, thyroid abnormality,arrhythmia    Patient does report that he feels that his gait is off, however he does endorse alcohol and drug use. In the emergency department, gait is intact. No other neurologic deficits identified on history or exam.  No vertiginous symptoms. At this time very low suspicion for stroke or intracranial pathology. Labs and EKG obtained. Workup showed:  ED Course as of 10/25/22 1722   Fri Oct 21, 2022   2337 The Ekg interpreted by me shows  normal sinus rhythm with a rate of 97  Axis is   Normal  QTc is  within an acceptable range  Intervals and Durations are unremarkable. ST Segments: nonspecific changes  No previous for comparison [ER]   Sat Oct 22, 2022   0008 No leukocytosis, anemia, thrombocytopenia [ER]   8084 Ethanol, salicylate, acetaminophen levels negative [ER]   0038 Mild transaminitis, improved from previous. Patient does not report right upper quadrant pain and has no tenderness to palpation.   Low suspicion for acute hepatic pathology [ER]   0038 Patient does have evidence of mild kidney dysfunction with creatinine of 1.4. Patient is receiving fluids. No electrolyte abnormalities. [ER]   0112 TSH within normal limits [ER]   0140 On reassessment, patient states that he is feeling improved [ER]   0201 Urine drug screen positive for cannabinoids which patient endorsed using. Also positive for cocaine, amphetamine, and oxycodone [ER]   0202 Patient updated with results, he feels that he is at baseline. He feels comfortable going home at this time. Patient is alert and oriented. Patient has been ambulatory in the emergency department without difficulty. [ER]      ED Course User Index  [ER] Geoffrey Mendieta MD     Is this patient to be included in the SEP-1 Core Measure due to severe sepsis or septic shock? No   Exclusion criteria - the patient is NOT to be included for SEP-1 Core Measure due to:  2+ SIRS criteria are not met    During the patient's ED course, the patient was given:  Medications   0.9 % sodium chloride bolus (0 mLs IntraVENous Stopped 10/22/22 0023)      Work-up remarkable for mild kidney dysfunction. Patient did receive fluids in the emergency department urine drug screen was positive for multiple drugs, patient denies use of anything but marijuana and alcohol, unclear how these drugs got his system, but do suspect this is likely the cause of him \"feeling off \". No emergent pathologies identified in the emergency department, patient states that he feels well and wishes to go home, patient tolerating p.o. and ambulatory without difficulty in the emergency department. Encourage cessation of drug use, encourage PCP follow-up as soon as possible. Patient came via EMS so contacted a ride. Patient discharged in stable condition. CLINICAL IMPRESSION  1. Polysubstance use disorder        Blood pressure 95/72, pulse 81, temperature 97.7 °F (36.5 °C), temperature source Oral, resp.  rate 14, height 6' (1.829 m), weight 185 lb (83.9 kg), SpO2 98 %. DISPOSITION  Bhumika Montano was discharged to home in stable condition. Patient was given scripts for the following medications. I counseled patient how to take these medications. Discharge Medication List as of 10/22/2022  2:07 AM          [facility]  [facility]  [address] [facility]  [phone]  Go to   As needed, If symptoms worsen    [facility]  [phone]        DISCLAIMER: This chart was created using Dragon dictation software. Efforts were made by me to ensure accuracy, however some errors may be present due to limitations of this technology and occasionally words are not transcribed correctly.         Memo Min MD  10/25/22 9704

## 2022-11-13 ENCOUNTER — HOSPITAL ENCOUNTER (EMERGENCY)
Age: 47
Discharge: HOME | End: 2022-11-13
Attending: EMERGENCY MEDICINE
Payer: COMMERCIAL

## 2022-11-13 VITALS
OXYGEN SATURATION: 98 % | SYSTOLIC BLOOD PRESSURE: 105 MMHG | HEART RATE: 84 BPM | TEMPERATURE: 98.3 F | RESPIRATION RATE: 16 BRPM | BODY MASS INDEX: 25.06 KG/M2 | WEIGHT: 185 LBS | DIASTOLIC BLOOD PRESSURE: 65 MMHG | HEIGHT: 72 IN

## 2022-11-13 DIAGNOSIS — R05.1 ACUTE COUGH: Primary | ICD-10-CM

## 2022-11-13 DIAGNOSIS — J98.8 RESPIRATORY INFECTION: ICD-10-CM

## 2022-11-13 LAB
RAPID INFLUENZA  B AGN: NEGATIVE
RAPID INFLUENZA A AGN: NEGATIVE
SARS-COV-2, NAAT: NOT DETECTED

## 2022-11-13 PROCEDURE — 87804 INFLUENZA ASSAY W/OPTIC: CPT

## 2022-11-13 PROCEDURE — 87635 SARS-COV-2 COVID-19 AMP PRB: CPT

## 2022-11-13 PROCEDURE — 6370000000 HC RX 637 (ALT 250 FOR IP): Performed by: EMERGENCY MEDICINE

## 2022-11-13 PROCEDURE — 99283 EMERGENCY DEPT VISIT LOW MDM: CPT

## 2022-11-13 RX ORDER — BENZONATATE 100 MG/1
100 CAPSULE ORAL ONCE
Status: COMPLETED | OUTPATIENT
Start: 2022-11-13 | End: 2022-11-13

## 2022-11-13 RX ORDER — BENZONATATE 100 MG/1
100 CAPSULE ORAL 2 TIMES DAILY PRN
Qty: 20 CAPSULE | Refills: 0 | Status: SHIPPED | OUTPATIENT
Start: 2022-11-13

## 2022-11-13 RX ORDER — AZITHROMYCIN 250 MG/1
250 TABLET, FILM COATED ORAL SEE ADMIN INSTRUCTIONS
Qty: 6 TABLET | Refills: 0 | Status: SHIPPED | OUTPATIENT
Start: 2022-11-13 | End: 2022-11-18

## 2022-11-13 RX ADMIN — BENZONATATE 100 MG: 100 CAPSULE ORAL at 14:40

## 2022-11-13 ASSESSMENT — PAIN - FUNCTIONAL ASSESSMENT
PAIN_FUNCTIONAL_ASSESSMENT: NONE - DENIES PAIN
PAIN_FUNCTIONAL_ASSESSMENT: NONE - DENIES PAIN

## 2022-11-13 NOTE — ED PROVIDER NOTES
[facility] EMERGENCY DEPARTMENT      CHIEF COMPLAINT  URI (Prod cough with yellow mucus, body aches, chest congestion starting Thursday. )       HISTORY OF PRESENT ILLNESS  Fede Martins is a 56 y.o. male  who presents to the ED complaining of significant cough with yellow sputum associated with feeling sore from coughing so much. He states that is associated with some discomfort into his ears and up into the throat especially the cough. Patient without any evident fevers. No vomiting but he has had some slight diarrhea. No abdominal pain. No known sick contacts. No prior vaccination against COVID or flu. He has tried to take Mucinex without improvement. He has been feeling ill since Thursday with generalized fatigue, myalgias as well. No other complaints, modifying factors or associated symptoms. I have reviewed the following from the nursing documentation.     Past Medical History:   Diagnosis Date    Abdominal pain 2012    Constipation     Hepatitis B 08/10/2020    Hepatitis C 08/10/2020    Nausea     Reflux     Wears glasses      Past Surgical History:   Procedure Laterality Date    COLONOSCOPY  5/14/2012    KNEE SURGERY      mcl, acl    WRIST SURGERY Left 7/17/2013    left thumb repair, ulnar and radial nerve repair     Family History   Problem Relation Age of Onset    Heart Disease Mother     Cancer Mother     Substance Abuse Mother     Mental Illness Mother     Cancer Father         prostate    Heart Disease Father     Mental Illness Father     Cancer Maternal Aunt         stomach    Mental Illness Brother     Substance Abuse Brother      Social History     Socioeconomic History    Marital status:      Spouse name: Not on file    Number of children: Not on file    Years of education: Not on file    Highest education level: Not on file   Occupational History    Not on file   Tobacco Use    Smoking status: Former     Packs/day: 1.00     Years: 25.00     Pack years: 25.00     Types: Cigarettes    Smokeless tobacco: Never   Vaping Use    Vaping Use: Never used   Substance and Sexual Activity    Alcohol use: Yes     Alcohol/week: 2.0 standard drinks     Types: 2 Cans of beer per week     Comment: today    Drug use: Yes     Types: Marijuana (Weed), IV, Methamphetamines (Crystal Meth)     Comment: last use March 2021    Sexual activity: Yes     Partners: Female   Other Topics Concern    Not on file   Social History Narrative    Not on file     Social Determinants of Health     Financial Resource Strain: Not on file   Food Insecurity: Not on file   Transportation Needs: Not on file   Physical Activity: Not on file   Stress: Not on file   Social Connections: Not on file   Intimate Partner Violence: Not on file   Housing Stability: Not on file     No current facility-administered medications for this encounter. Current Outpatient Medications   Medication Sig Dispense Refill    benzonatate (TESSALON) 100 MG capsule Take 1 capsule by mouth 2 times daily as needed for Cough 20 capsule 0    azithromycin (ZITHROMAX) 250 MG tablet Take 1 tablet by mouth See Admin Instructions for 5 days 500mg on day 1 followed by 250mg on days 2 - 5 6 tablet 0     No Known Allergies    REVIEW OF SYSTEMS  10 systems reviewed, pertinent positives per HPI otherwise noted to be negative. PHYSICAL EXAM  /65   Pulse 84   Temp 98.3 °F (36.8 °C) (Oral)   Resp 16   Ht 6' (1.829 m)   Wt 185 lb (83.9 kg)   SpO2 98%   BMI 25.09 kg/m²    GENERAL APPEARANCE: Awake and alert. Cooperative. No acute distress. HENT: Normocephalic. Atraumatic. Mucous membranes are moist.  No drooling or stridor. No significant posterior oropharyngeal erythema or exudate. Uvula midline and nonedematous. There is some cobblestoning of the posterior oropharynx no unilateral swelling or fullness to the tonsillar pillars. Effusion noted posterior to the TMs bilaterally without significant erythema or bulging.   No drainage within the ear canals bilaterally. NECK: Supple. No nuchal rigidity. EYES: PERRL. EOM's grossly intact. HEART/CHEST: RRR. No murmurs. LUNGS: Respirations unlabored. CTAB. No wheezes rales or rhonchi good air exchange. Speaking comfortably in full sentences. ABDOMEN: No tenderness. Soft. Non-distended. No masses. No organomegaly. No guarding or rebound. MUSCULOSKELETAL: No extremity edema. Compartments soft. No deformity. No tenderness in the extremities. All extremities neurovascularly intact. SKIN: Warm and dry. No acute rashes. NEUROLOGICAL: Alert and oriented. CN's 2-12 intact. No gross facial drooping. No gross focal deficits  PSYCHIATRIC: Normal mood and affect. LABS  I have reviewed all labs for this visit. Results for orders placed or performed during the hospital encounter of 11/13/22   Rapid influenza A/B antigens    Specimen: Nasopharyngeal   Result Value Ref Range    Rapid Influenza A Ag Negative Negative    Rapid Influenza B Ag Negative Negative   COVID-19, Rapid    Specimen: Nasopharyngeal Swab   Result Value Ref Range    SARS-CoV-2, NAAT Not Detected Not Detected       RADIOLOGY  None     ED COURSE/MDM  Patient seen and evaluated. Old records reviewed. Labs reviewed and results discussed with patient. Patient presenting with cough and generalized malaise. Possibly viral type illness but that has been going on now for several nonimprovement he has a significant smoking history this raising concern for possible atypical bacterial cause. He has increased sputum production. Rapid flu and COVID both negative. We will place him on azithromycin empirically for possible bacterial atypical pneumonia, and have him follow-up closely as an outpatient for recheck. He is very much in agreement with that plan. He was given Tessalon while here for symptom control with improvement will be prescribed that as well. Discussed rest, hydration and close follow-up.   Reasons to return to the ER were discussed and all questions answered at time of discharge. I, Dr. Shantal Kwan MD, am the primary clinician of record. Is this patient to be included in the SEP-1 Core Measure? No   Exclusion criteria - the patient is NOT to be included for SEP-1 Core Measure due to:  2+ SIRS criteria are not met     During the patient's ED course, the patient was given:  Medications   benzonatate (TESSALON) capsule 100 mg (100 mg Oral Given 11/13/22 1440)        CLINICAL IMPRESSION  1. Acute cough    2. Respiratory infection        Blood pressure 105/65, pulse 84, temperature 98.3 °F (36.8 °C), temperature source Oral, resp. rate 16, height 6' (1.829 m), weight 185 lb (83.9 kg), SpO2 98 %. DISPOSITION  Blas Leong was discharged to home in stable condition. Patient was given scripts for the following medications. I counseled patient how to take these medications. Discharge Medication List as of 11/13/2022  3:05 PM        START taking these medications    Details   benzonatate (TESSALON) 100 MG capsule Take 1 capsule by mouth 2 times daily as needed for Cough, Disp-20 capsule, R-0Normal      azithromycin (ZITHROMAX) 250 MG tablet Take 1 tablet by mouth See Admin Instructions for 5 days 500mg on day 1 followed by 250mg on days 2 - 5, Disp-6 tablet, R-0Normal             Follow-up with:  [facility]) Pre-Services  [phone]  Schedule an appointment as soon as possible for a visit in 2 days  To establish care with a primary care physician[facility]    [facility]  [phone]        DISCLAIMER: This chart was created using Dragon dictation software. Efforts were made by me to ensure accuracy, however some errors may be present due to limitations of this technology and occasionally words are not transcribed correctly.        Shantal Kwan MD  11/13/22 2997

## 2022-11-13 NOTE — LETTER
[facility]  [address] [id number]  Phone: [phone]             November 13, 2022    Patient: Milla Dorsey   YOB: 1975   Date of Visit: 11/13/2022       To Whom It May Concern:    Sherman Eng was seen and treated in our emergency department on 11/13/2022. He may return to work on 11/15/2022.       Sincerely,             Signature:__________________________________

## 2022-11-13 NOTE — ED NOTES
Discharge instructions and medications reviewed with patient. Patient verbalized understanding of medications and follow up. All questions answered at this time. Skin warm, pink, and dry. Patient alert and oriented x4. Pt ambulated to lobby with a stable gait. Patient discharged home with 2 prescriptions.        Myah Matthews RN  11/13/22 7480

## 2023-01-29 ENCOUNTER — HOSPITAL ENCOUNTER (EMERGENCY)
Age: 48
Discharge: PSYCHIATRIC OR SUBSTANCE USE FACILITY | End: 2023-01-30
Attending: EMERGENCY MEDICINE
Payer: COMMERCIAL

## 2023-01-29 DIAGNOSIS — F19.959 SUBSTANCE-INDUCED PSYCHOTIC DISORDER (HCC): Primary | ICD-10-CM

## 2023-01-29 LAB
A/G RATIO: 1.6 (ref 1.1–2.2)
ACETAMINOPHEN LEVEL: <5 UG/ML (ref 10–30)
ALBUMIN SERPL-MCNC: 4.5 G/DL (ref 3.4–5)
ALP BLD-CCNC: 51 U/L (ref 40–129)
ALT SERPL-CCNC: 61 U/L (ref 10–40)
AMPHETAMINE SCREEN, URINE: POSITIVE
ANION GAP SERPL CALCULATED.3IONS-SCNC: 12 MMOL/L (ref 3–16)
AST SERPL-CCNC: 46 U/L (ref 15–37)
BARBITURATE SCREEN URINE: ABNORMAL
BASOPHILS ABSOLUTE: 0.1 K/UL (ref 0–0.2)
BASOPHILS RELATIVE PERCENT: 0.6 %
BENZODIAZEPINE SCREEN, URINE: ABNORMAL
BILIRUB SERPL-MCNC: 1.8 MG/DL (ref 0–1)
BUN BLDV-MCNC: 17 MG/DL (ref 7–20)
CALCIUM SERPL-MCNC: 8.7 MG/DL (ref 8.3–10.6)
CANNABINOID SCREEN URINE: POSITIVE
CHLORIDE BLD-SCNC: 102 MMOL/L (ref 99–110)
CO2: 23 MMOL/L (ref 21–32)
COCAINE METABOLITE SCREEN URINE: ABNORMAL
CREAT SERPL-MCNC: 0.8 MG/DL (ref 0.9–1.3)
EOSINOPHILS ABSOLUTE: 0.1 K/UL (ref 0–0.6)
EOSINOPHILS RELATIVE PERCENT: 0.7 %
ETHANOL: NORMAL MG/DL (ref 0–0.08)
FENTANYL SCREEN, URINE: ABNORMAL
GFR SERPL CREATININE-BSD FRML MDRD: >60 ML/MIN/{1.73_M2}
GLUCOSE BLD-MCNC: 102 MG/DL (ref 70–99)
HCT VFR BLD CALC: 42.2 % (ref 40.5–52.5)
HEMOGLOBIN: 14.8 G/DL (ref 13.5–17.5)
INFLUENZA A: NOT DETECTED
INFLUENZA B: NOT DETECTED
LYMPHOCYTES ABSOLUTE: 1.3 K/UL (ref 1–5.1)
LYMPHOCYTES RELATIVE PERCENT: 14.4 %
Lab: ABNORMAL
MCH RBC QN AUTO: 33.2 PG (ref 26–34)
MCHC RBC AUTO-ENTMCNC: 35.1 G/DL (ref 31–36)
MCV RBC AUTO: 94.8 FL (ref 80–100)
METHADONE SCREEN, URINE: ABNORMAL
MONOCYTES ABSOLUTE: 1 K/UL (ref 0–1.3)
MONOCYTES RELATIVE PERCENT: 10.6 %
NEUTROPHILS ABSOLUTE: 6.8 K/UL (ref 1.7–7.7)
NEUTROPHILS RELATIVE PERCENT: 73.7 %
OPIATE SCREEN URINE: ABNORMAL
OXYCODONE URINE: ABNORMAL
PDW BLD-RTO: 13.5 % (ref 12.4–15.4)
PH UA: 6
PHENCYCLIDINE SCREEN URINE: ABNORMAL
PLATELET # BLD: 220 K/UL (ref 135–450)
PMV BLD AUTO: 7.8 FL (ref 5–10.5)
POTASSIUM REFLEX MAGNESIUM: 3.7 MMOL/L (ref 3.5–5.1)
RBC # BLD: 4.45 M/UL (ref 4.2–5.9)
SALICYLATE, SERUM: <0.3 MG/DL (ref 15–30)
SARS-COV-2 RNA, RT PCR: NOT DETECTED
SODIUM BLD-SCNC: 137 MMOL/L (ref 136–145)
TOTAL PROTEIN: 7.4 G/DL (ref 6.4–8.2)
WBC # BLD: 9.3 K/UL (ref 4–11)

## 2023-01-29 PROCEDURE — 82077 ASSAY SPEC XCP UR&BREATH IA: CPT

## 2023-01-29 PROCEDURE — 87636 SARSCOV2 & INF A&B AMP PRB: CPT

## 2023-01-29 PROCEDURE — 99285 EMERGENCY DEPT VISIT HI MDM: CPT

## 2023-01-29 PROCEDURE — 6370000000 HC RX 637 (ALT 250 FOR IP): Performed by: EMERGENCY MEDICINE

## 2023-01-29 PROCEDURE — 80179 DRUG ASSAY SALICYLATE: CPT

## 2023-01-29 PROCEDURE — 85025 COMPLETE CBC W/AUTO DIFF WBC: CPT

## 2023-01-29 PROCEDURE — 36415 COLL VENOUS BLD VENIPUNCTURE: CPT

## 2023-01-29 PROCEDURE — 80143 DRUG ASSAY ACETAMINOPHEN: CPT

## 2023-01-29 PROCEDURE — 80307 DRUG TEST PRSMV CHEM ANLYZR: CPT

## 2023-01-29 PROCEDURE — 80053 COMPREHEN METABOLIC PANEL: CPT

## 2023-01-29 RX ORDER — OLANZAPINE 10 MG/1
10 TABLET ORAL ONCE
Status: COMPLETED | OUTPATIENT
Start: 2023-01-29 | End: 2023-01-29

## 2023-01-29 RX ORDER — DIPHENHYDRAMINE HCL 25 MG
50 TABLET ORAL ONCE
Status: DISCONTINUED | OUTPATIENT
Start: 2023-01-29 | End: 2023-01-30 | Stop reason: HOSPADM

## 2023-01-29 RX ORDER — OLANZAPINE 10 MG/1
10 INJECTION, POWDER, LYOPHILIZED, FOR SOLUTION INTRAMUSCULAR ONCE
Status: DISCONTINUED | OUTPATIENT
Start: 2023-01-29 | End: 2023-01-30 | Stop reason: HOSPADM

## 2023-01-29 RX ORDER — WATER 1000 ML/1000ML
INJECTION, SOLUTION INTRAVENOUS
Status: DISPENSED
Start: 2023-01-29 | End: 2023-01-29

## 2023-01-29 RX ORDER — HALOPERIDOL 5 MG/1
5 TABLET ORAL ONCE
Status: DISCONTINUED | OUTPATIENT
Start: 2023-01-29 | End: 2023-01-30 | Stop reason: HOSPADM

## 2023-01-29 RX ADMIN — OLANZAPINE 10 MG: 10 TABLET, FILM COATED ORAL at 03:26

## 2023-01-29 NOTE — ED NOTES
Patient to [facility] treatment room B3. Patient changed into safety gown and belongings secured in corresponding locker. Patient is visibly agitated and appears paranoid. Increased PMA.       Frank Sr RN  01/29/23 6134

## 2023-01-29 NOTE — ED NOTES
Patient refused PO Haldol and Benadryl. Remains paranoid but less agitated. Will continue to monitor for safety.       Guerita Phan RN  01/29/23 1995

## 2023-01-29 NOTE — ED NOTES
Writer called all of patients listed contact numbers. Left voice for ex roxana Madsen. Asked patient to provide another contact number, patient states that he lives with a friend and that he does not own a phone.      Michael Velásquez  01/29/23 0813

## 2023-01-29 NOTE — ED NOTES
Level of Care Disposition: Transfer     Patient was seen by ED provider and [facility] staff. The case presented to psychiatric provider on-call . Based on the ED evaluation and information presented to the provider by [facility] it is the recommendation of the on call psychiatric provider that inpatient hospitalization is the least restrictive environment for the patient at this time. The patient will be admitted to the inpatient unit.      Insurance Pre certification Authorization: [facility] [address] [place] [address] [address], Rhonda Hartman  01/29/23 3028

## 2023-01-29 NOTE — ED NOTES
Patient refused IM Zyprexa 10 mg. Agreed to take and was given PO Zyprexa 10 mg as ordered for agitation.       Christiano Basurto RN  01/29/23 7767

## 2023-01-29 NOTE — ED NOTES
Patient up eating dinner. VS obtained. Mariel GIPSON to assess patient.       Brooke Rodriguez RN  01/29/23 3376

## 2023-01-29 NOTE — ED NOTES
Patient is currently resting in treatment room. Respirations even and easy. No distress noted. Will continue to monitor.     Ashley Valdovinos  01/29/23 0881

## 2023-01-29 NOTE — ED NOTES
Presenting Problem: Patient [facility] to [facility] on a SOB after being found rolling in the mud in front of the police station. Pt told writer that he was rolling around and pretending he was having a seizure in order to make a group of people believe he was dying. Pt believes that there was a group of people, in a car, following him, trying to hurt him. He was unable to explain who these people were or why they want to hurt him. He is not RTIS and his speech is organized but is clearly delusional. Tearful when describing his current situation. Pt is very disheveled and malodorous. Denies all SI/HI/AVH. Appearance/Hygiene:  ill-appearing, hospital attire, seated in bed, poor grooming, and poor hygiene   Motor Behavior:  WNL  Attitude: cooperative  Affect: depressed affect   Speech: normal pitch and normal volume  Mood: anxious and depressed   Thought Processes: Unusual fears  Perceptions: Absent   Thought content: Pt believes that someone or a group of people are trying to harm him. Orientation: A&Ox4   Memory: intact  Concentration: Good    Insight/ judgement: impaired judgment and insight, paranoid      Psychosocial and contextual factors: Pt is currently homeless and staying with a friend named Ana Rowell. He has no supportive people in his life other than the people who sell him drugs. He is extremely paranoid believing that everyone is conspiring against him or wants him dead. He is unable to explain why other than stating, \"I just know it. ..you're never going to believe me\". Writer attempted to educate him on the symptoms of methamphetamine abuse but was unable to break through pts delusions. He reports that he graduated from the [employer] in April of last year and was sober for 11 months. He wants to stop using drugs for good. Pt reports that all his \"brothers and sisters are dead\".  He reports that he has a gf of seven years named April, yet he was unable to give me many details about her and states he sees her when he can. Pt is currently on probation. C-SSRS flowsheet is  Complete. Psychiatric History (including current outpatient provider and past inpatient admissions): [facility]. Has been seen in the ED and [facility] many times of drug intoxication. He is currently a client at [address] [place] [address] and has been going there for six months.      Access to Firearms: Denies     ASSESSMENT FOR IMMINENT FUTURE DANGER:    RISK FACTORS:    []  Age <25 or >49   [x]  Male gender   [x]  Depressed mood   []  Active suicidal ideation   []  Suicide plan   []  Suicide attempt   []  Access to lethal means   []  Prior suicide attempt   [x]  Active substance abuse (UDS +amphetamines and MJ)   []  Highly impulsive behaviors   [x]  Not attending to self-care/ADLs    []  Recent significant loss   []  Chronic pain or medical illness   []  Social isolation   []  History of violence    []  Active psychosis   []  Cognitive impairment    []  No outpatient services in place   []  Medication noncompliance   [x]  No collateral information to support safety  [] Self- injurious/ Self-harm behavior    PROTECTIVE FACTORS:  [] Age >25 and <55  [] Female gender   [] Denies depression  [x] Denies suicidal ideation  [x] Does not have lethal plan   [x] Does not have access to guns or weapons  [x] Patient is verbally marcos for safety  [x] No prior suicide attempts  [] No active substance abuse  [] Patient has social or family support  [] No active psychosis or cognitive dysfunction  [] Physically healthy  [x] Has outpatient services in place  [] Compliant with recommended medications  [] Collateral information from  supports patient safety   [] Patient is future oriented with plans to            Jeff Weinstein  01/29/23 Regine Mullins

## 2023-01-29 NOTE — ED PROVIDER NOTES
CardiacEmergency Physician Note  [address] ED  [address] [facility] [place] [address]  Dept: [phone]  Loc: [phone]  Open Note Time:  2:49 AM EST    Chief Complaint  No chief complaint on file. History of Present Illness  Steffany James is a 56 y.o. male  has a past medical history of Abdominal pain, Constipation, Hepatitis B, Hepatitis C, Nausea, Reflux, and Wears glasses. who presents to the ED for psychiatric evaluation. Patient was brought in by the police department. Patient had gone into a gas station, he was barefoot and acting very erratic. Police were called to the gas station and brought  the patient in for further evaluation. Patient has a known history of polysubstance abuse    Unable to assess review of systems as patient is altered    Unless otherwise stated in this report or unable to obtain because of the patient's clinical or mental status as evidenced by the medical record, this patient's positive and negative responses for review of systems, constitutional, psych, eyes, ENT, cardiovascular, respiratory, gastrointestinal, neurological, genitourinary, musculoskeletal, integument systems and systems related to the presenting problem are either stated in the preceding paragraph or were not pertinent or were negative for the symptoms and/or complaints related to the medical problem. I have reviewed the following from the nursing documentation:      Prior to Admission medications    Medication Sig Start Date End Date Taking?  Authorizing Provider   benzonatate (TESSALON) 100 MG capsule Take 1 capsule by mouth 2 times daily as needed for Cough 11/13/22   Savage Galloway MD       Allergies as of 01/29/2023    (No Known Allergies)       Past Medical History:   Diagnosis Date    Abdominal pain 2012    Constipation     Hepatitis B 08/10/2020    Hepatitis C 08/10/2020    Nausea     Reflux     Wears glasses         Surgical History:   Past Surgical History:   Procedure Laterality Date    COLONOSCOPY  5/14/2012    KNEE SURGERY      mcl, acl    WRIST SURGERY Left 7/17/2013    left thumb repair, ulnar and radial nerve repair        Family History:    Family History   Problem Relation Age of Onset    Heart Disease Mother     Cancer Mother     Substance Abuse Mother     Mental Illness Mother     Cancer Father         prostate    Heart Disease Father     Mental Illness Father     Cancer Maternal Aunt         stomach    Mental Illness Brother     Substance Abuse Brother        Social History     Socioeconomic History    Marital status:      Spouse name: Not on file    Number of children: Not on file    Years of education: Not on file    Highest education level: Not on file   Occupational History    Not on file   Tobacco Use    Smoking status: Former     Packs/day: 1.00     Years: 25.00     Pack years: 25.00     Types: Cigarettes    Smokeless tobacco: Never   Vaping Use    Vaping Use: Never used   Substance and Sexual Activity    Alcohol use: Yes     Alcohol/week: 2.0 standard drinks     Types: 2 Cans of beer per week     Comment: today    Drug use: Yes     Types: Marijuana (Weed), IV, Methamphetamines (Crystal Meth)     Comment: last use March 2021    Sexual activity: Yes     Partners: Female   Other Topics Concern    Not on file   Social History Narrative    Not on file     Social Determinants of Health     Financial Resource Strain: Not on file   Food Insecurity: Not on file   Transportation Needs: Not on file   Physical Activity: Not on file   Stress: Not on file   Social Connections: Not on file   Intimate Partner Violence: Not on file   Housing Stability: Not on file      has a past medical history of Abdominal pain (2012), Constipation, Hepatitis B (08/10/2020), Hepatitis C (08/10/2020), Nausea, Reflux, and Wears glasses. Nursing notes reviewed.   PAtient refuses vital signs      GENERAL:   There is no height or weight on file to calculate BMI.  Awake, alert. Well developed, well nourished with no apparent distress. Nontoxic-appearing, non-ill-appearing. HENT:   Normocephalic, Atraumatic, no lacerations. No ENT exam due to PPE. EYES:   Conjunctiva normal,   Pupils equal round and reactive to light,   Extraocular movements normal.  NECK:  Trachea is midline. No stridor. CHEST:  Elevated rate and regular rhythm, no murmurs/rubs/gallops,  normal S1/S2, chest wall non-tender. LUNGS:  No respiratory distress. No abdominal retractions, no sternal retractions  Clear to auscultation bilaterally, no wheezing, no rhochi, no rales  Speaking comfortably in full sentences  ABDOMEN:  Soft, non-tender, non-distended. No guarding. No rebound. EXTREMITIES:  Moves extremities x4 with purpose. Normal range of motion, no edema,  No tenderness, no deformity,  distal pulses present and equal bilaterally. SKIN:  Warm, dry and intact. NEUROLOGIC:  Altered  mental status. Moving all extremities to command. Alert and oriented to self and place on  without focal motor deficit or gross sensory deficit. Speech is without dysarthria and slurring  Strength 5/5 in bilateral upper and lower extremities. Sensation is intact in the upper and lower extremities. Patient able to ambulate without assistance and no abnormal gait  PSYCHIATRIC:  anxious,  Bizarre mood and congruent affect,  Appropriate eye contact,  thoughts are nonlinear and discharge,  with delusions/hallucinations,  Not responding to internal stimuli,  responds inappropriately to questions  Denies SI/HI    LABS and DIAGNOSTIC RESULTS    RADIOLOGY  X-RAYS:  I have reviewed radiologic plain film image(s). ALL OTHER NON-PLAIN FILM IMAGES SUCH AS CT, ULTRASOUND AND MRI HAVE BEEN READ BY THE RADIOLOGIST.   No orders to display          LABS  Results for orders placed or performed during the hospital encounter of 01/29/23   CBC with Auto Differential   Result Value Ref Range    WBC 9.3 4.0 - 11.0 K/uL    RBC 4. 45 4.20 - 5.90 M/uL    Hemoglobin 14.8 13.5 - 17.5 g/dL    Hematocrit 42.2 40.5 - 52.5 %    MCV 94.8 80.0 - 100.0 fL    MCH 33.2 26.0 - 34.0 pg    MCHC 35.1 31.0 - 36.0 g/dL    RDW 13.5 12.4 - 15.4 %    Platelets 680 531 - 708 K/uL    MPV 7.8 5.0 - 10.5 fL    Neutrophils % 73.7 %    Lymphocytes % 14.4 %    Monocytes % 10.6 %    Eosinophils % 0.7 %    Basophils % 0.6 %    Neutrophils Absolute 6.8 1.7 - 7.7 K/uL    Lymphocytes Absolute 1.3 1.0 - 5.1 K/uL    Monocytes Absolute 1.0 0.0 - 1.3 K/uL    Eosinophils Absolute 0.1 0.0 - 0.6 K/uL    Basophils Absolute 0.1 0.0 - 0.2 K/uL   Comprehensive Metabolic Panel w/ Reflex to MG   Result Value Ref Range    Sodium 137 136 - 145 mmol/L    Potassium reflex Magnesium 3.7 3.5 - 5.1 mmol/L    Chloride 102 99 - 110 mmol/L    CO2 23 21 - 32 mmol/L    Anion Gap 12 3 - 16    Glucose 102 (H) 70 - 99 mg/dL    BUN 17 7 - 20 mg/dL    Creatinine 0.8 (L) 0.9 - 1.3 mg/dL    Est, Glom Filt Rate >60 >60    Calcium 8.7 8.3 - 10.6 mg/dL    Total Protein 7.4 6.4 - 8.2 g/dL    Albumin 4.5 3.4 - 5.0 g/dL    Albumin/Globulin Ratio 1.6 1.1 - 2.2    Total Bilirubin 1.8 (H) 0.0 - 1.0 mg/dL    Alkaline Phosphatase 51 40 - 129 U/L    ALT 61 (H) 10 - 40 U/L    AST 46 (H) 15 - 37 U/L   Ethanol   Result Value Ref Range    Ethanol Lvl None Detected mg/dL   Acetaminophen (TYLENOL) level   Result Value Ref Range    Acetaminophen Level <5 (L) 10 - 30 ug/mL   Salicylate   Result Value Ref Range    Salicylate, Serum <1.6 (L) 15.0 - 30.0 mg/dL       SCREENINGS  NIH Score     [facility]     [facility]    Heart Score         PROCEDURES  Unless otherwise noted below, none    Procedures    MEDICAL DECISION MAKING  I am the Primary Clinician of Record.     Procedures/interventions/images ordered for this visit  Orders Placed This Encounter   Procedures    COVID-19 & Influenza Combo    CBC with Auto Differential    Comprehensive Metabolic Panel w/ Reflex to MG    Ethanol    DRUG SCREEN MULTI URINE    Acetaminophen (TYLENOL) level    Salicylate       Medications ordered for this visit  Medications   OLANZapine (ZYPREXA) injection 10 mg (10 mg IntraMUSCular Not Given 1/29/23 0321)   sterile water injection (  Not Given 1/29/23 0322)   haloperidol (HALDOL) tablet 5 mg (5 mg Oral Not Given 1/29/23 0457)   diphenhydrAMINE (BENADRYL) tablet 50 mg (50 mg Oral Not Given 1/29/23 0456)   OLANZapine (ZYPREXA) tablet 10 mg (10 mg Oral Given 1/29/23 0326)       ED course notes for this visit  ED Course as of 01/29/23 0539   Pearla Net Jan 29, 2023   0425 Patient continues to be   agitated and very restless [SG]      ED Course User Index  [SG] Fabian Bhatti MD       Is this patient to be included in the SEP-1 Core Measure due to severe sepsis or septic shock? No   Exclusion criteria - the patient is NOT to be included for SEP-1 Core Measure due to: Infection is not suspected    I evaluated the patient in room B3/B3      Consults ordered:  None  Discussion with Other Professionals : None    Social Determinants : Patient is Homeless and Patient has / had difficulty affording medications     Chronic Conditions:  See HPI and PMHx    Records Reviewed : Other patient had multiple reviews and evaluations in the ER in this current EMR for polysubstance abuse. Most recent was in November 2022. For external ER notes the most recent is from 3 years ago when he was evaluated at [facility] for substance-induced psychotic disorder        Disposition Considerations   (include 1 Tests not done, Shared Decision Making, Pt expectation of Test or Tx.):  Differential diagnoses: Drug or alcohol use or abuse, psychosis, behavioral mental illness, metabolic disturbance, infection, neurologic emergency, other    This patient presented to the ED at risk of harming themselves or others, meeting criteria to be placed on an involuntary hold. Involuntary form was completed and pt was informed that they are being placed on this hold.   Pt  does have a history of psychological and substance use problems. They are nontoxic appearing. Vital signs were reviewed and addressed. They were examined for acute injuries and illness. No gross evidence of self harm. Appropriate tests were ordered in the ED to medically clear them for transfer and/or evaluation by a behavioral health unit. There is no significant evidence of underlying medical etiology for the pts current psychological issue, such as toxidrome, CVA, SAH, cerebral tumor, acute coronary syndrome, toxicity, shock, sepsis, electrolyte imbalance, thyroid irregularity, or intoxication needing inpatient detox. I have performed a medical clearance examination on this patient. It is my opinion that no medical conditions were discovered that would preclude admission to a behavioral health unit or discharge home. I feel that the patient is medically stable for disposition by the behavioral health team at this time. Final Impression    1. Substance-induced psychotic disorder ([address]        There were no vitals taken for this visit. Disposition  Pt is in stable condition upon Transfer to [facility] - medically cleared for psychiatric evaluation. Pt was seen during the [place] 19 pandemic. Appropriate PPE worn by this writer during patient encounters. Pt seen during a time with constrained hospital bed capacity and other potential inpatient and outpatient resources were constrained due to the viral pandemic. The note was completed using Dragon voice recognition transcription. Every effort was made to ensure accuracy; however, inadvertent transcription errors may be present despite my best efforts to edit errors.     Fabiola Bernstein MD  [address] MD [place]  01/29/23 4214

## 2023-01-29 NOTE — ED NOTES
Patient continues to sleep. Dr. Mercedes Coronel updated about patient. Once awakens more will assess patient.       Nel Esteban, ONDINA  01/29/23 9661

## 2023-01-29 NOTE — ED NOTES
Patient continues to sleep. Patient awakens when talked to then falls back asleep. Will continue to monitor patient.      VINH Weinstein  01/29/23 3250

## 2023-01-29 NOTE — ED NOTES
Patient is currently resting in treatment room. Respirations even and easy. No distress noted. Will continue to monitor.      Gino Srinivasan, [place]  01/29/23 7815

## 2023-01-29 NOTE — ED NOTES
Patient resting with eyes closed in [facility] chair in [facility]. Respirations easy and even.       Cipriano Borges RN  01/29/23 2105

## 2023-01-29 NOTE — ED NOTES
Attempted to get patients vitals, patients removed cuff from arm and would not allow writer to put it back on.      Miguel Ángel Check  01/29/23 1895

## 2023-01-29 NOTE — ED NOTES
Patient is currently resting in treatment room. Respirations even and easy. No distress noted. Will continue to monitor.      Ricardo Nicholson, VINH  01/29/23 1433

## 2023-01-29 NOTE — ED NOTES
Patient continues paranoid and distrustful of staff. Sitting in [facility] chair. C/O skin itching. Called ED staff  for assistance with lab draws. Will continue to monitor for safety.       Juancarlos Harrison RN  01/29/23 3824

## 2023-01-29 NOTE — ED NOTES
Patient continues to sleep. Patient awakens when talked to then falls back asleep. Will continue to monitor patient.      Katelyn Magallon RN  01/29/23 0775

## 2023-01-30 VITALS
RESPIRATION RATE: 18 BRPM | OXYGEN SATURATION: 99 % | DIASTOLIC BLOOD PRESSURE: 76 MMHG | SYSTOLIC BLOOD PRESSURE: 120 MMHG | HEART RATE: 90 BPM | TEMPERATURE: 97.8 F

## 2023-01-30 NOTE — ED NOTES
Breakfast tray and orange juice provided. Pt made aware of the plan to be transferred to [facility] with ETA at 0900. Pt verbalized his understanding.       Issac Rios RN  01/30/23 7593

## 2023-01-30 NOTE — ED NOTES
Patient remains in treatment room resting. Respirations easy and even. No distress noted. Will continue to monitor.      Jasmine Vera  01/30/23 4909

## 2023-01-30 NOTE — ED NOTES
Patient is resting in room. Awakens quickly when opening the door. He is pleasant and cooperative. Vitals are stable and he was given a pitcher of water.      Thierry Aparicio  01/30/23 2776

## 2023-01-30 NOTE — ED NOTES
Patient is resting in treatment room. Respirations are even and easy. No distress noted. Will continue to monitor.      Markus Congress  01/30/23 0705

## 2023-01-30 NOTE — ED NOTES
Patient has been resting in treatment room B3. Vitals are stable. Snack and drink provided to patient. Will continue to monitor.      Anjelica De La O  01/30/23 0016

## 2023-01-30 NOTE — ED NOTES
Nursing report called to ONDINA Carvalho at [facility] ([phone]). Pt left via [employer]. One bag of belongings given to EMS. Pt asked about his cowboy boots and was informed by nightshift staff he did not come in with them.      Candis Jeff RN  01/30/23 2916

## 2023-04-30 ENCOUNTER — HOSPITAL ENCOUNTER (EMERGENCY)
Age: 48
Discharge: HOME | End: 2023-04-30
Attending: EMERGENCY MEDICINE
Payer: COMMERCIAL

## 2023-04-30 VITALS
WEIGHT: 178 LBS | RESPIRATION RATE: 20 BRPM | HEIGHT: 72 IN | SYSTOLIC BLOOD PRESSURE: 124 MMHG | DIASTOLIC BLOOD PRESSURE: 75 MMHG | HEART RATE: 93 BPM | BODY MASS INDEX: 24.11 KG/M2 | OXYGEN SATURATION: 98 % | TEMPERATURE: 98.2 F

## 2023-04-30 DIAGNOSIS — L02.91 ABSCESS: Primary | ICD-10-CM

## 2023-04-30 DIAGNOSIS — F19.90 ILLICIT DRUG USE: ICD-10-CM

## 2023-04-30 PROCEDURE — 99283 EMERGENCY DEPT VISIT LOW MDM: CPT

## 2023-04-30 PROCEDURE — 6370000000 HC RX 637 (ALT 250 FOR IP): Performed by: EMERGENCY MEDICINE

## 2023-04-30 RX ORDER — SULFAMETHOXAZOLE AND TRIMETHOPRIM 800; 160 MG/1; MG/1
1 TABLET ORAL 2 TIMES DAILY
Qty: 20 TABLET | Refills: 0 | Status: SHIPPED | OUTPATIENT
Start: 2023-04-30 | End: 2023-05-10

## 2023-04-30 RX ORDER — CEPHALEXIN 500 MG/1
500 CAPSULE ORAL 4 TIMES DAILY
Qty: 40 CAPSULE | Refills: 0 | Status: SHIPPED | OUTPATIENT
Start: 2023-04-30 | End: 2023-05-10

## 2023-04-30 RX ORDER — CEPHALEXIN 500 MG/1
500 CAPSULE ORAL ONCE
Status: COMPLETED | OUTPATIENT
Start: 2023-04-30 | End: 2023-04-30

## 2023-04-30 RX ORDER — SULFAMETHOXAZOLE AND TRIMETHOPRIM 800; 160 MG/1; MG/1
1 TABLET ORAL ONCE
Status: COMPLETED | OUTPATIENT
Start: 2023-04-30 | End: 2023-04-30

## 2023-04-30 RX ADMIN — CEPHALEXIN 500 MG: 500 CAPSULE ORAL at 22:56

## 2023-04-30 RX ADMIN — SULFAMETHOXAZOLE AND TRIMETHOPRIM 1 TABLET: 800; 160 TABLET ORAL at 22:56

## 2023-04-30 ASSESSMENT — PAIN SCALES - GENERAL: PAINLEVEL_OUTOF10: 8

## 2023-04-30 ASSESSMENT — PAIN DESCRIPTION - ORIENTATION: ORIENTATION: LEFT

## 2023-04-30 ASSESSMENT — LIFESTYLE VARIABLES
HOW OFTEN DO YOU HAVE A DRINK CONTAINING ALCOHOL: NEVER
HOW MANY STANDARD DRINKS CONTAINING ALCOHOL DO YOU HAVE ON A TYPICAL DAY: PATIENT DOES NOT DRINK

## 2023-04-30 ASSESSMENT — PAIN DESCRIPTION - FREQUENCY: FREQUENCY: CONTINUOUS

## 2023-04-30 ASSESSMENT — PAIN - FUNCTIONAL ASSESSMENT: PAIN_FUNCTIONAL_ASSESSMENT: 0-10

## 2023-04-30 ASSESSMENT — PAIN DESCRIPTION - DESCRIPTORS: DESCRIPTORS: BURNING;THROBBING

## 2023-04-30 ASSESSMENT — PAIN DESCRIPTION - LOCATION: LOCATION: ARM

## 2023-04-30 ASSESSMENT — PAIN DESCRIPTION - PAIN TYPE: TYPE: ACUTE PAIN

## 2023-05-01 NOTE — ED TRIAGE NOTES
Patient states he has a abscess on his left arm in the a/c area after shooting up with ice four days ago

## 2023-05-01 NOTE — ED PROVIDER NOTES
[address] ED  EMERGENCY DEPARTMENT ENCOUNTER        Pt Name: Gino Ugalde  MRN: [id number]  [place] 1975  Date of evaluation: 4/30/2023  Provider: Mckayla Huntley MD  PCP: No primary care provider on file. Note Started: 10:33 PM EDT 4/30/23    CHIEF COMPLAINT     My arm  HISTORY OF PRESENT ILLNESS: 1 or more Elements     Chief Complaint   Patient presents with    Abscess     Left arm a/c area      History from : Patient  Limitations to history : None    Gino Ugalde is a 56 y.o. male who presents to the emergency department secondary to concern for left arm abscess. He reports this has happened to him before when he was doing drugs. He states he did shoot up a few days ago. He denies any fevers or vomiting, he did feel a little nauseated earlier today. He states he is in a bad way as he is  from his wife for 2 weeks and currently sort of homeless. He has not been eating and drinking great. No chest pain trouble breathing. He does have pain at the site of the abscess. Past medical history noted below. States what he thought he did a few days ago was ice. Aside from what is stated above denies any other symptoms or modifying factors. Nursing Notes were all reviewed and agreed with or any disagreements addressed in HPI/MDM.   REVIEW OF SYSTEMS :    Review of Systems  Pertinent positive and negative findings as documented in the HPI  PAST MEDICAL HISTORY      Past Medical History:   Diagnosis Date    Abdominal pain 2012    Constipation     Hepatitis B 08/10/2020    Hepatitis C 08/10/2020    Nausea     Reflux     Wears glasses        SURGICALHISTORY       Past Surgical History:   Procedure Laterality Date    COLONOSCOPY  5/14/2012    KNEE SURGERY      mcl, acl    WRIST SURGERY Left 7/17/2013    left thumb repair, ulnar and radial nerve repair     CURRENT MEDICATIONS       Previous Medications    BENZONATATE (TESSALON) 100 MG CAPSULE    Take 1 capsule by mouth 2

## 2023-05-01 NOTE — DISCHARGE INSTRUCTIONS
We have prescribed you antibiotics for the abscess and skin infection on your arm. Make sure you take them as prescribed  - Bactrim twice a day (breakfast and dinner)  - Keflex 4 times a day (breakfast, lunch, dinner, before bed)    We have also given you a referral to primary care. Return to emergency department for new or worsening symptoms or concerns.

## 2023-05-02 ENCOUNTER — HOSPITAL ENCOUNTER (EMERGENCY)
Age: 48
Discharge: HOME | End: 2023-05-02
Admitting: PHYSICIAN ASSISTANT
Payer: COMMERCIAL

## 2023-05-02 VITALS
HEIGHT: 72 IN | WEIGHT: 178 LBS | OXYGEN SATURATION: 100 % | DIASTOLIC BLOOD PRESSURE: 76 MMHG | HEART RATE: 84 BPM | BODY MASS INDEX: 24.11 KG/M2 | TEMPERATURE: 98.4 F | RESPIRATION RATE: 16 BRPM | SYSTOLIC BLOOD PRESSURE: 144 MMHG

## 2023-05-02 DIAGNOSIS — L02.91 ABSCESS: Primary | ICD-10-CM

## 2023-05-02 PROCEDURE — 99283 EMERGENCY DEPT VISIT LOW MDM: CPT

## 2023-05-02 PROCEDURE — 6370000000 HC RX 637 (ALT 250 FOR IP): Performed by: PHYSICIAN ASSISTANT

## 2023-05-02 PROCEDURE — 10061 I&D ABSCESS COMP/MULTIPLE: CPT

## 2023-05-02 RX ORDER — IBUPROFEN 400 MG/1
800 TABLET ORAL ONCE
Status: COMPLETED | OUTPATIENT
Start: 2023-05-02 | End: 2023-05-02

## 2023-05-02 RX ADMIN — IBUPROFEN 800 MG: 400 TABLET, FILM COATED ORAL at 19:16

## 2023-05-02 ASSESSMENT — PAIN DESCRIPTION - LOCATION: LOCATION: ELBOW

## 2023-05-02 ASSESSMENT — PAIN - FUNCTIONAL ASSESSMENT
PAIN_FUNCTIONAL_ASSESSMENT: 0-10
PAIN_FUNCTIONAL_ASSESSMENT: 0-10

## 2023-05-02 ASSESSMENT — PAIN DESCRIPTION - ORIENTATION: ORIENTATION: LEFT

## 2023-05-02 ASSESSMENT — PAIN SCALES - GENERAL
PAINLEVEL_OUTOF10: 5
PAINLEVEL_OUTOF10: 10
PAINLEVEL_OUTOF10: 5

## 2023-05-02 NOTE — ED PROVIDER NOTES
[address] [facility]        Pt Name: Juno Garcia  MRN: [id number]  [place] 1975  Date of evaluation: 5/2/2023  Provider: Renea Rutherford PA-C  PCP: No primary care provider on file. Note Started: 7:11 PM EDT 5/2/23      LENNY. I have evaluated this patient. My supervising physician was available for consultation. CHIEF COMPLAINT       Chief Complaint   Patient presents with    Abscess     Pt at [facility] yesterday and started on abx for abcess on his L forearm after using IV drugs. States that they told him to come back if it got worse. Pt has very large abcess to his L forearm. HISTORY OF PRESENT ILLNESS: 1 or more Elements     History From: Patient  Limitations to history : None    Juno Garcia is a 56 y.o. male who presents to the emergency department for evaluation of abscess left arm. Admits to IV drug use states he relapsed. Was seen at [facility] for same 2 days ago started on antibiotics Bactrim and Keflex. States the swelling has gotten bigger and the skin is starting to split open. Scant drainage on exam.  Afebrile. No vomiting. Not diabetic. History of abscesses. Nursing Notes were all reviewed and agreed with or any disagreements were addressed in the HPI. REVIEW OF SYSTEMS :      Review of Systems    Positives and Pertinent negatives as per HPI.      SURGICAL HISTORY     Past Surgical History:   Procedure Laterality Date    COLONOSCOPY  5/14/2012    KNEE SURGERY      mcl, acl    WRIST SURGERY Left 7/17/2013    left thumb repair, ulnar and radial nerve repair       CURRENTMEDICATIONS       Previous Medications    BENZONATATE (TESSALON) 100 MG CAPSULE    Take 1 capsule by mouth 2 times daily as needed for Cough    CEPHALEXIN (KEFLEX) 500 MG CAPSULE    Take 1 capsule by mouth 4 times daily for 10 days    SULFAMETHOXAZOLE-TRIMETHOPRIM (BACTRIM DS) 800-160 MG PER TABLET    Take 1 tablet by mouth 2 times daily for

## 2023-05-03 NOTE — DISCHARGE INSTRUCTIONS
Warm compresses. Packing removal in 2 days. Continue taking Bactrim and Keflex. Ibuprofen for pain. Arrange close follow-up appointment in 2 to 3 days with primary care doctor. Return to the ER for any worsening symptoms.

## 2023-12-25 ENCOUNTER — HOSPITAL ENCOUNTER (EMERGENCY)
Age: 48
Discharge: HOME | End: 2023-12-25
Attending: STUDENT IN AN ORGANIZED HEALTH CARE EDUCATION/TRAINING PROGRAM
Payer: COMMERCIAL

## 2023-12-25 VITALS
OXYGEN SATURATION: 100 % | SYSTOLIC BLOOD PRESSURE: 137 MMHG | TEMPERATURE: 98 F | RESPIRATION RATE: 16 BRPM | HEART RATE: 90 BPM | DIASTOLIC BLOOD PRESSURE: 92 MMHG

## 2023-12-25 DIAGNOSIS — F15.929 METHAMPHETAMINE INTOXICATION (HCC): Primary | ICD-10-CM

## 2023-12-25 LAB
ALBUMIN SERPL-MCNC: 4.9 G/DL (ref 3.4–5)
ALBUMIN/GLOB SERPL: 1.8 {RATIO} (ref 1.1–2.2)
ALP SERPL-CCNC: 51 U/L (ref 40–129)
ALT SERPL-CCNC: 153 U/L (ref 10–40)
AMPHETAMINES UR QL SCN>1000 NG/ML: POSITIVE
ANION GAP SERPL CALCULATED.3IONS-SCNC: 9 MMOL/L (ref 3–16)
APAP SERPL-MCNC: <5 UG/ML (ref 10–30)
AST SERPL-CCNC: 92 U/L (ref 15–37)
BARBITURATES UR QL SCN>200 NG/ML: ABNORMAL
BASOPHILS # BLD: 0.1 K/UL (ref 0–0.2)
BASOPHILS NFR BLD: 0.7 %
BENZODIAZ UR QL SCN>200 NG/ML: ABNORMAL
BILIRUB SERPL-MCNC: 1.2 MG/DL (ref 0–1)
BUN SERPL-MCNC: 24 MG/DL (ref 7–20)
CALCIUM SERPL-MCNC: 9.2 MG/DL (ref 8.3–10.6)
CANNABINOIDS UR QL SCN>50 NG/ML: POSITIVE
CHLORIDE SERPL-SCNC: 101 MMOL/L (ref 99–110)
CO2 SERPL-SCNC: 27 MMOL/L (ref 21–32)
COCAINE UR QL SCN: ABNORMAL
CREAT SERPL-MCNC: 0.9 MG/DL (ref 0.9–1.3)
DEPRECATED RDW RBC AUTO: 13 % (ref 12.4–15.4)
DRUG SCREEN COMMENT UR-IMP: ABNORMAL
EOSINOPHIL # BLD: 0.1 K/UL (ref 0–0.6)
EOSINOPHIL NFR BLD: 0.5 %
ETHANOLAMINE SERPL-MCNC: NORMAL MG/DL (ref 0–0.08)
FENTANYL SCREEN, URINE: ABNORMAL
GFR SERPLBLD CREATININE-BSD FMLA CKD-EPI: >60 ML/MIN/{1.73_M2}
GLUCOSE SERPL-MCNC: 106 MG/DL (ref 70–99)
HCT VFR BLD AUTO: 39.8 % (ref 40.5–52.5)
HGB BLD-MCNC: 14 G/DL (ref 13.5–17.5)
LYMPHOCYTES # BLD: 1.4 K/UL (ref 1–5.1)
LYMPHOCYTES NFR BLD: 15 %
MCH RBC QN AUTO: 33.6 PG (ref 26–34)
MCHC RBC AUTO-ENTMCNC: 35.1 G/DL (ref 31–36)
MCV RBC AUTO: 95.5 FL (ref 80–100)
METHADONE UR QL SCN>300 NG/ML: ABNORMAL
MONOCYTES # BLD: 1 K/UL (ref 0–1.3)
MONOCYTES NFR BLD: 10.4 %
NEUTROPHILS # BLD: 6.8 K/UL (ref 1.7–7.7)
NEUTROPHILS NFR BLD: 73.4 %
OPIATES UR QL SCN>300 NG/ML: ABNORMAL
OXYCODONE UR QL SCN: ABNORMAL
PCP UR QL SCN>25 NG/ML: ABNORMAL
PH UR STRIP: 5 [PH]
PLATELET # BLD AUTO: 200 K/UL (ref 135–450)
PMV BLD AUTO: 7.6 FL (ref 5–10.5)
POTASSIUM SERPL-SCNC: 3.9 MMOL/L (ref 3.5–5.1)
PROT SERPL-MCNC: 7.6 G/DL (ref 6.4–8.2)
RBC # BLD AUTO: 4.16 M/UL (ref 4.2–5.9)
SALICYLATES SERPL-MCNC: <0.3 MG/DL (ref 15–30)
SODIUM SERPL-SCNC: 137 MMOL/L (ref 136–145)
WBC # BLD AUTO: 9.3 K/UL (ref 4–11)

## 2023-12-25 PROCEDURE — 82077 ASSAY SPEC XCP UR&BREATH IA: CPT

## 2023-12-25 PROCEDURE — 80053 COMPREHEN METABOLIC PANEL: CPT

## 2023-12-25 PROCEDURE — 36415 COLL VENOUS BLD VENIPUNCTURE: CPT

## 2023-12-25 PROCEDURE — 85025 COMPLETE CBC W/AUTO DIFF WBC: CPT

## 2023-12-25 PROCEDURE — 90792 PSYCH DIAG EVAL W/MED SRVCS: CPT

## 2023-12-25 PROCEDURE — 99283 EMERGENCY DEPT VISIT LOW MDM: CPT

## 2023-12-25 PROCEDURE — 80307 DRUG TEST PRSMV CHEM ANLYZR: CPT

## 2023-12-25 PROCEDURE — 80143 DRUG ASSAY ACETAMINOPHEN: CPT

## 2023-12-25 PROCEDURE — 80179 DRUG ASSAY SALICYLATE: CPT

## 2023-12-25 NOTE — ED PROVIDER NOTES
Patient has been evaluated by psychiatry. Patient has been cleared by psychiatry as he has no complaints of homicidal or suicidal ideation and is well aware of his drug use disorder. His symptoms are likely related to methamphetamine use. Psychiatric resources have been provided to the patient for outpatient psychiatry and substance abuse programs.      Adam Perez MD  12/25/23 1963
and intoxicated. SKIN: Color normal. No rash. Warm, Dry    LABS  I have reviewed all labs for this visit.    Results for orders placed or performed during the hospital encounter of 12/25/23   Acetaminophen Level   Result Value Ref Range    Acetaminophen Level <5 (L) 10 - 30 ug/mL   CBC with Auto Differential   Result Value Ref Range    WBC 9.3 4.0 - 11.0 K/uL    RBC 4.16 (L) 4.20 - 5.90 M/uL    Hemoglobin 14.0 13.5 - 17.5 g/dL    Hematocrit 39.8 (L) 40.5 - 52.5 %    MCV 95.5 80.0 - 100.0 fL    MCH 33.6 26.0 - 34.0 pg    MCHC 35.1 31.0 - 36.0 g/dL    RDW 13.0 12.4 - 15.4 %    Platelets 083 454 - 695 K/uL    MPV 7.6 5.0 - 10.5 fL    Neutrophils % 73.4 %    Lymphocytes % 15.0 %    Monocytes % 10.4 %    Eosinophils % 0.5 %    Basophils % 0.7 %    Neutrophils Absolute 6.8 1.7 - 7.7 K/uL    Lymphocytes Absolute 1.4 1.0 - 5.1 K/uL    Monocytes Absolute 1.0 0.0 - 1.3 K/uL    Eosinophils Absolute 0.1 0.0 - 0.6 K/uL    Basophils Absolute 0.1 0.0 - 0.2 K/uL   CMP w/ Reflex to MG   Result Value Ref Range    Sodium 137 136 - 145 mmol/L    Potassium reflex Magnesium 3.9 3.5 - 5.1 mmol/L    Chloride 101 99 - 110 mmol/L    CO2 27 21 - 32 mmol/L    Anion Gap 9 3 - 16    Glucose 106 (H) 70 - 99 mg/dL    BUN 24 (H) 7 - 20 mg/dL    Creatinine 0.9 0.9 - 1.3 mg/dL    Est, Glom Filt Rate >60 >60    Calcium 9.2 8.3 - 10.6 mg/dL    Total Protein 7.6 6.4 - 8.2 g/dL    Albumin 4.9 3.4 - 5.0 g/dL    Albumin/Globulin Ratio 1.8 1.1 - 2.2    Total Bilirubin 1.2 (H) 0.0 - 1.0 mg/dL    Alkaline Phosphatase 51 40 - 129 U/L     (H) 10 - 40 U/L    AST 92 (H) 15 - 37 U/L   Ethanol   Result Value Ref Range    Ethanol Lvl None Detected mg/dL   Salicylate   Result Value Ref Range    Salicylate, Serum <1.9 (L) 15.0 - 30.0 mg/dL   Urine Drug Screen   Result Value Ref Range    Amphetamine Screen, Urine POSITIVE (A) Negative <1000ng/mL    Barbiturate Screen, Ur Neg Negative <200 ng/mL    Benzodiazepine Screen, Urine Neg Negative <200 ng/mL
Purple DH (Discharge Huddle; Vulnerable Patient)